# Patient Record
Sex: FEMALE | URBAN - METROPOLITAN AREA
[De-identification: names, ages, dates, MRNs, and addresses within clinical notes are randomized per-mention and may not be internally consistent; named-entity substitution may affect disease eponyms.]

---

## 2017-11-21 ENCOUNTER — APPOINTMENT (RX ONLY)
Dept: URBAN - METROPOLITAN AREA CLINIC 52 | Facility: CLINIC | Age: 42
Setting detail: DERMATOLOGY
End: 2017-11-21

## 2017-11-21 DIAGNOSIS — L83 ACANTHOSIS NIGRICANS: ICD-10-CM

## 2017-11-21 DIAGNOSIS — D22 MELANOCYTIC NEVI: ICD-10-CM

## 2017-11-21 DIAGNOSIS — L72.0 EPIDERMAL CYST: ICD-10-CM

## 2017-11-21 DIAGNOSIS — B07.8 OTHER VIRAL WARTS: ICD-10-CM

## 2017-11-21 PROBLEM — D23.72 OTHER BENIGN NEOPLASM OF SKIN OF LEFT LOWER LIMB, INCLUDING HIP: Status: ACTIVE | Noted: 2017-11-21

## 2017-11-21 PROBLEM — D22.9 MELANOCYTIC NEVI, UNSPECIFIED: Status: ACTIVE | Noted: 2017-11-21

## 2017-11-21 PROCEDURE — ? PRESCRIPTION

## 2017-11-21 PROCEDURE — 99214 OFFICE O/P EST MOD 30 MIN: CPT | Mod: 25

## 2017-11-21 PROCEDURE — 17110 DESTRUCTION B9 LES UP TO 14: CPT

## 2017-11-21 PROCEDURE — ? LIQUID NITROGEN

## 2017-11-21 PROCEDURE — ? COUNSELING

## 2017-11-21 PROCEDURE — ? OTHER

## 2017-11-21 PROCEDURE — ? TREATMENT REGIMEN

## 2017-11-21 RX ORDER — AMMONIUM LACTATE 12 %
LOTION (GRAM) TOPICAL
Qty: 1 | Refills: 4 | Status: ERX | COMMUNITY
Start: 2017-11-21

## 2017-11-21 RX ADMIN — Medication: at 16:29

## 2017-11-21 ASSESSMENT — LOCATION DETAILED DESCRIPTION DERM
LOCATION DETAILED: RIGHT ANTERIOR SHOULDER
LOCATION DETAILED: MID POSTERIOR NECK
LOCATION DETAILED: LEFT ELBOW
LOCATION DETAILED: LEFT MEDIAL INFERIOR EYELID
LOCATION DETAILED: RIGHT ELBOW
LOCATION DETAILED: RIGHT POSTERIOR SHOULDER
LOCATION DETAILED: LEFT SUPERIOR MEDIAL MALAR CHEEK

## 2017-11-21 ASSESSMENT — LOCATION ZONE DERM
LOCATION ZONE: EYELID
LOCATION ZONE: ARM
LOCATION ZONE: FACE
LOCATION ZONE: NECK

## 2017-11-21 ASSESSMENT — LOCATION SIMPLE DESCRIPTION DERM
LOCATION SIMPLE: RIGHT ELBOW
LOCATION SIMPLE: LEFT ELBOW
LOCATION SIMPLE: RIGHT SHOULDER
LOCATION SIMPLE: POSTERIOR NECK
LOCATION SIMPLE: LEFT INFERIOR EYELID
LOCATION SIMPLE: LEFT CHEEK

## 2017-11-21 NOTE — PROCEDURE: TREATMENT REGIMEN
Detail Level: Simple
Plan: Recommend Pt to see ophthalmologist to remove or treat milia underneath the left eye and on left eyelid due to such close proximity to the eye.

## 2017-11-21 NOTE — PROCEDURE: LIQUID NITROGEN
Detail Level: Simple
Add 52 Modifier (Optional): no
Render Post-Care Instructions In Note?: yes
Number Of Freeze-Thaw Cycles: 2 freeze-thaw cycles
Post-Care Instructions: I reviewed with the patient in detail post-care instructions. Patient is to wear sunprotection, and avoid picking at any of the treated lesions. Pt may apply Rubbing alcohol to treated areas two times per day for two days.
Consent: The patient's consent was obtained including but not limited to risks of crusting, scabbing, blistering, scarring, darker or lighter pigmentary change, recurrence, incomplete removal and infection.
Aperture Size (Optional): C
Medical Necessity Clause: This procedure was medically necessary because the lesions that were treated were:  Inflamed and irritating
Medical Necessity Information: It is in your best interest to select a reason for this procedure from the list below. All of these items fulfill various CMS LCD requirements except the new and changing color options.

## 2022-07-09 ENCOUNTER — TELEPHONE ENCOUNTER (OUTPATIENT)
Dept: URBAN - METROPOLITAN AREA CLINIC 121 | Facility: CLINIC | Age: 47
End: 2022-07-09

## 2022-07-10 ENCOUNTER — TELEPHONE ENCOUNTER (OUTPATIENT)
Dept: URBAN - METROPOLITAN AREA CLINIC 121 | Facility: CLINIC | Age: 47
End: 2022-07-10

## 2022-08-08 PROBLEM — Z00.00 ENCOUNTER FOR PREVENTIVE HEALTH EXAMINATION: Status: ACTIVE | Noted: 2022-08-08

## 2022-08-09 ENCOUNTER — APPOINTMENT (OUTPATIENT)
Dept: ORTHOPEDIC SURGERY | Facility: CLINIC | Age: 47
End: 2022-08-09

## 2022-08-09 VITALS — BODY MASS INDEX: 39.99 KG/M2 | WEIGHT: 240 LBS | HEIGHT: 65 IN

## 2022-08-09 DIAGNOSIS — E11.9 TYPE 2 DIABETES MELLITUS W/OUT COMPLICATIONS: ICD-10-CM

## 2022-08-09 DIAGNOSIS — S93.524A SPRAIN OF METATARSOPHALANGEAL JOINT OF RIGHT LESSER TOE(S), INITIAL ENCOUNTER: ICD-10-CM

## 2022-08-09 DIAGNOSIS — M79.7 FIBROMYALGIA: ICD-10-CM

## 2022-08-09 DIAGNOSIS — G35 MULTIPLE SCLEROSIS: ICD-10-CM

## 2022-08-09 PROCEDURE — 99203 OFFICE O/P NEW LOW 30 MIN: CPT

## 2022-08-09 PROCEDURE — 73630 X-RAY EXAM OF FOOT: CPT | Mod: RT

## 2022-08-09 NOTE — DISCUSSION/SUMMARY
[de-identified] : Discussed federico taping and diabetic optimization. Hard soled shoe.\par Elevation and ice. Diabetic footcare handout given. \par f/u 2 weeks

## 2022-08-09 NOTE — PHYSICAL EXAM
[Right] : right foot and ankle [Mild] : mild swelling of toe(s) [5___] : plantar flexion 5[unfilled]/5 [2+] : dorsalis pedis pulse: 2+ [Decreased] : saphenous nerve sensation decreased [4th] : 4th [5th] : 5th [] : no achilles tendon insertion tenderness

## 2022-08-23 ENCOUNTER — APPOINTMENT (OUTPATIENT)
Dept: ORTHOPEDIC SURGERY | Facility: CLINIC | Age: 47
End: 2022-08-23

## 2023-01-01 NOTE — PROCEDURE: OTHER
Detail Level: Detailed
Note Text (......Xxx Chief Complaint.): This diagnosis correlates with the
negative

## 2023-05-16 ENCOUNTER — APPOINTMENT (OUTPATIENT)
Dept: ORTHOPEDIC SURGERY | Facility: CLINIC | Age: 48
End: 2023-05-16

## 2024-01-10 NOTE — HISTORY OF PRESENT ILLNESS
[de-identified] : 8/9/22: Pt is a 47 year old F who presents today for evaluation of their right foot; reports slipped and fell in the office on 8/6/22 due to lack of balance and sensation in the LE due to progressive multiple sclerosis. There is bruising and swelling to the lateral foot. She takes morphine and hydrodocone for left knee pain and lumbar ddd. Hx DM Glucose 143 this AM; last HGA1C 9.0%\par \par Hx fibromyalgia, lupus, bells palsy, MS - prednisone dependent 5mg daily and 10mg prn\par She takes gabapentin  [] : Post Surgical Visit: no [FreeTextEntry1] : R foot Detail Level: Detailed

## 2024-03-25 ENCOUNTER — INPATIENT (INPATIENT)
Facility: HOSPITAL | Age: 49
LOS: 5 days | Discharge: HOME HEALTH | End: 2024-03-31
Attending: INTERNAL MEDICINE | Admitting: INTERNAL MEDICINE
Payer: MEDICARE

## 2024-03-25 VITALS
TEMPERATURE: 98 F | HEART RATE: 87 BPM | OXYGEN SATURATION: 97 % | RESPIRATION RATE: 19 BRPM | DIASTOLIC BLOOD PRESSURE: 84 MMHG | SYSTOLIC BLOOD PRESSURE: 128 MMHG

## 2024-03-25 DIAGNOSIS — L03.011 CELLULITIS OF RIGHT FINGER: ICD-10-CM

## 2024-03-25 DIAGNOSIS — F90.9 ATTENTION-DEFICIT HYPERACTIVITY DISORDER, UNSPECIFIED TYPE: ICD-10-CM

## 2024-03-25 DIAGNOSIS — G35 MULTIPLE SCLEROSIS: ICD-10-CM

## 2024-03-25 DIAGNOSIS — F31.9 BIPOLAR DISORDER, UNSPECIFIED: ICD-10-CM

## 2024-03-25 DIAGNOSIS — E87.6 HYPOKALEMIA: ICD-10-CM
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
## 2024-03-25 DIAGNOSIS — E11.65 TYPE 2 DIABETES MELLITUS WITH HYPERGLYCEMIA: ICD-10-CM

## 2024-03-25 DIAGNOSIS — M79.7 FIBROMYALGIA: ICD-10-CM

## 2024-03-25 DIAGNOSIS — S61.259A OPEN BITE OF UNSPECIFIED FINGER WITHOUT DAMAGE TO NAIL, INITIAL ENCOUNTER: ICD-10-CM

## 2024-03-25 LAB
ALBUMIN SERPL ELPH-MCNC: 3.3 G/DL — SIGNIFICANT CHANGE UP (ref 3.3–5)
ALP SERPL-CCNC: 88 U/L — SIGNIFICANT CHANGE UP (ref 40–120)
ALT FLD-CCNC: 27 U/L — SIGNIFICANT CHANGE UP (ref 12–78)
ANION GAP SERPL CALC-SCNC: 4 MMOL/L — LOW (ref 5–17)
AST SERPL-CCNC: 37 U/L — SIGNIFICANT CHANGE UP (ref 15–37)
BASOPHILS # BLD AUTO: 0.05 K/UL — SIGNIFICANT CHANGE UP (ref 0–0.2)
BASOPHILS NFR BLD AUTO: 0.6 % — SIGNIFICANT CHANGE UP (ref 0–2)
BILIRUB SERPL-MCNC: 0.4 MG/DL — SIGNIFICANT CHANGE UP (ref 0.2–1.2)
BUN SERPL-MCNC: 7 MG/DL — SIGNIFICANT CHANGE UP (ref 7–23)
CALCIUM SERPL-MCNC: 9.4 MG/DL — SIGNIFICANT CHANGE UP (ref 8.5–10.1)
CHLORIDE SERPL-SCNC: 101 MMOL/L — SIGNIFICANT CHANGE UP (ref 96–108)
CO2 SERPL-SCNC: 34 MMOL/L — HIGH (ref 22–31)
CREAT SERPL-MCNC: 0.76 MG/DL — SIGNIFICANT CHANGE UP (ref 0.5–1.3)
EGFR: 97 ML/MIN/1.73M2 — SIGNIFICANT CHANGE UP
EOSINOPHIL # BLD AUTO: 0.16 K/UL — SIGNIFICANT CHANGE UP (ref 0–0.5)
EOSINOPHIL NFR BLD AUTO: 1.8 % — SIGNIFICANT CHANGE UP (ref 0–6)
ERYTHROCYTE [SEDIMENTATION RATE] IN BLOOD: 38 MM/HR — HIGH (ref 0–15)
GLUCOSE BLDC GLUCOMTR-MCNC: 276 MG/DL — HIGH (ref 70–99)
GLUCOSE SERPL-MCNC: 233 MG/DL — HIGH (ref 70–99)
HCG SERPL-ACNC: 3 MIU/ML — SIGNIFICANT CHANGE UP
HCT VFR BLD CALC: 34.9 % — SIGNIFICANT CHANGE UP (ref 34.5–45)
HGB BLD-MCNC: 11.5 G/DL — SIGNIFICANT CHANGE UP (ref 11.5–15.5)
IMM GRANULOCYTES NFR BLD AUTO: 1.2 % — HIGH (ref 0–0.9)
LYMPHOCYTES # BLD AUTO: 2.62 K/UL — SIGNIFICANT CHANGE UP (ref 1–3.3)
LYMPHOCYTES # BLD AUTO: 29.4 % — SIGNIFICANT CHANGE UP (ref 13–44)
MCHC RBC-ENTMCNC: 28.8 PG — SIGNIFICANT CHANGE UP (ref 27–34)
MCHC RBC-ENTMCNC: 33 G/DL — SIGNIFICANT CHANGE UP (ref 32–36)
MCV RBC AUTO: 87.5 FL — SIGNIFICANT CHANGE UP (ref 80–100)
MONOCYTES # BLD AUTO: 0.44 K/UL — SIGNIFICANT CHANGE UP (ref 0–0.9)
MONOCYTES NFR BLD AUTO: 4.9 % — SIGNIFICANT CHANGE UP (ref 2–14)
NEUTROPHILS # BLD AUTO: 5.52 K/UL — SIGNIFICANT CHANGE UP (ref 1.8–7.4)
NEUTROPHILS NFR BLD AUTO: 62.1 % — SIGNIFICANT CHANGE UP (ref 43–77)
NRBC # BLD: 0 /100 WBCS — SIGNIFICANT CHANGE UP (ref 0–0)
PLATELET # BLD AUTO: 232 K/UL — SIGNIFICANT CHANGE UP (ref 150–400)
POTASSIUM SERPL-MCNC: 3.3 MMOL/L — LOW (ref 3.5–5.3)
POTASSIUM SERPL-SCNC: 3.3 MMOL/L — LOW (ref 3.5–5.3)
PROT SERPL-MCNC: 7.2 GM/DL — SIGNIFICANT CHANGE UP (ref 6–8.3)
RBC # BLD: 3.99 M/UL — SIGNIFICANT CHANGE UP (ref 3.8–5.2)
RBC # FLD: 13.7 % — SIGNIFICANT CHANGE UP (ref 10.3–14.5)
SODIUM SERPL-SCNC: 139 MMOL/L — SIGNIFICANT CHANGE UP (ref 135–145)
WBC # BLD: 8.9 K/UL — SIGNIFICANT CHANGE UP (ref 3.8–10.5)
WBC # FLD AUTO: 8.9 K/UL — SIGNIFICANT CHANGE UP (ref 3.8–10.5)

## 2024-03-25 PROCEDURE — 99222 1ST HOSP IP/OBS MODERATE 55: CPT

## 2024-03-25 PROCEDURE — 73130 X-RAY EXAM OF HAND: CPT | Mod: 26,RT

## 2024-03-25 PROCEDURE — 99285 EMERGENCY DEPT VISIT HI MDM: CPT

## 2024-03-25 RX ORDER — DEXTROSE 50 % IN WATER 50 %
25 SYRINGE (ML) INTRAVENOUS ONCE
Refills: 0 | Status: DISCONTINUED | OUTPATIENT
Start: 2024-03-25 | End: 2024-03-28

## 2024-03-25 RX ORDER — MONTELUKAST 4 MG/1
10 TABLET, CHEWABLE ORAL AT BEDTIME
Refills: 0 | Status: DISCONTINUED | OUTPATIENT
Start: 2024-03-25 | End: 2024-03-28

## 2024-03-25 RX ORDER — OXYCODONE AND ACETAMINOPHEN 5; 325 MG/1; MG/1
1 TABLET ORAL EVERY 12 HOURS
Refills: 0 | Status: DISCONTINUED | OUTPATIENT
Start: 2024-03-25 | End: 2024-03-28

## 2024-03-25 RX ORDER — DEXTROSE 50 % IN WATER 50 %
15 SYRINGE (ML) INTRAVENOUS ONCE
Refills: 0 | Status: DISCONTINUED | OUTPATIENT
Start: 2024-03-25 | End: 2024-03-28

## 2024-03-25 RX ORDER — DEXTROAMPHETAMINE SACCHARATE, AMPHETAMINE ASPARTATE, DEXTROAMPHETAMINE SULFATE AND AMPHETAMINE SULFATE 1.875; 1.875; 1.875; 1.875 MG/1; MG/1; MG/1; MG/1
20 TABLET ORAL
Refills: 0 | Status: DISCONTINUED | OUTPATIENT
Start: 2024-03-25 | End: 2024-03-26

## 2024-03-25 RX ORDER — SODIUM CHLORIDE 9 MG/ML
1000 INJECTION INTRAMUSCULAR; INTRAVENOUS; SUBCUTANEOUS ONCE
Refills: 0 | Status: COMPLETED | OUTPATIENT
Start: 2024-03-25 | End: 2024-03-25

## 2024-03-25 RX ORDER — ONDANSETRON 8 MG/1
4 TABLET, FILM COATED ORAL EVERY 8 HOURS
Refills: 0 | Status: DISCONTINUED | OUTPATIENT
Start: 2024-03-25 | End: 2024-03-28

## 2024-03-25 RX ORDER — SODIUM CHLORIDE 9 MG/ML
1000 INJECTION, SOLUTION INTRAVENOUS
Refills: 0 | Status: DISCONTINUED | OUTPATIENT
Start: 2024-03-25 | End: 2024-03-28

## 2024-03-25 RX ORDER — FUROSEMIDE 40 MG
20 TABLET ORAL DAILY
Refills: 0 | Status: DISCONTINUED | OUTPATIENT
Start: 2024-03-25 | End: 2024-03-28

## 2024-03-25 RX ORDER — LORATADINE 10 MG/1
10 TABLET ORAL DAILY
Refills: 0 | Status: DISCONTINUED | OUTPATIENT
Start: 2024-03-25 | End: 2024-03-28

## 2024-03-25 RX ORDER — INSULIN LISPRO 100/ML
VIAL (ML) SUBCUTANEOUS AT BEDTIME
Refills: 0 | Status: DISCONTINUED | OUTPATIENT
Start: 2024-03-25 | End: 2024-03-28

## 2024-03-25 RX ORDER — DEXTROSE 50 % IN WATER 50 %
12.5 SYRINGE (ML) INTRAVENOUS ONCE
Refills: 0 | Status: DISCONTINUED | OUTPATIENT
Start: 2024-03-25 | End: 2024-03-28

## 2024-03-25 RX ORDER — PANTOPRAZOLE SODIUM 20 MG/1
40 TABLET, DELAYED RELEASE ORAL
Refills: 0 | Status: DISCONTINUED | OUTPATIENT
Start: 2024-03-25 | End: 2024-03-28

## 2024-03-25 RX ORDER — TRAZODONE HCL 50 MG
50 TABLET ORAL AT BEDTIME
Refills: 0 | Status: DISCONTINUED | OUTPATIENT
Start: 2024-03-25 | End: 2024-03-28

## 2024-03-25 RX ORDER — METRONIDAZOLE 500 MG
500 TABLET ORAL ONCE
Refills: 0 | Status: COMPLETED | OUTPATIENT
Start: 2024-03-25 | End: 2024-03-25

## 2024-03-25 RX ORDER — ACETAMINOPHEN 500 MG
650 TABLET ORAL EVERY 6 HOURS
Refills: 0 | Status: DISCONTINUED | OUTPATIENT
Start: 2024-03-25 | End: 2024-03-28

## 2024-03-25 RX ORDER — CEFTRIAXONE 500 MG/1
1000 INJECTION, POWDER, FOR SOLUTION INTRAMUSCULAR; INTRAVENOUS ONCE
Refills: 0 | Status: COMPLETED | OUTPATIENT
Start: 2024-03-25 | End: 2024-03-25

## 2024-03-25 RX ORDER — SUMATRIPTAN SUCCINATE 4 MG/.5ML
100 INJECTION, SOLUTION SUBCUTANEOUS DAILY
Refills: 0 | Status: DISCONTINUED | OUTPATIENT
Start: 2024-03-25 | End: 2024-03-28

## 2024-03-25 RX ORDER — OXCARBAZEPINE 300 MG/1
600 TABLET, FILM COATED ORAL
Refills: 0 | Status: DISCONTINUED | OUTPATIENT
Start: 2024-03-25 | End: 2024-03-28

## 2024-03-25 RX ORDER — GLUCAGON INJECTION, SOLUTION 0.5 MG/.1ML
1 INJECTION, SOLUTION SUBCUTANEOUS ONCE
Refills: 0 | Status: DISCONTINUED | OUTPATIENT
Start: 2024-03-25 | End: 2024-03-28

## 2024-03-25 RX ORDER — CYCLOBENZAPRINE HYDROCHLORIDE 10 MG/1
10 TABLET, FILM COATED ORAL DAILY
Refills: 0 | Status: DISCONTINUED | OUTPATIENT
Start: 2024-03-25 | End: 2024-03-28

## 2024-03-25 RX ORDER — MORPHINE SULFATE 50 MG/1
15 CAPSULE, EXTENDED RELEASE ORAL
Refills: 0 | Status: DISCONTINUED | OUTPATIENT
Start: 2024-03-25 | End: 2024-03-28

## 2024-03-25 RX ORDER — GABAPENTIN 400 MG/1
400 CAPSULE ORAL
Refills: 0 | Status: DISCONTINUED | OUTPATIENT
Start: 2024-03-25 | End: 2024-03-28

## 2024-03-25 RX ORDER — METOPROLOL TARTRATE 50 MG
25 TABLET ORAL DAILY
Refills: 0 | Status: DISCONTINUED | OUTPATIENT
Start: 2024-03-25 | End: 2024-03-28

## 2024-03-25 RX ORDER — METRONIDAZOLE 500 MG
500 TABLET ORAL EVERY 8 HOURS
Refills: 0 | Status: DISCONTINUED | OUTPATIENT
Start: 2024-03-25 | End: 2024-03-27

## 2024-03-25 RX ORDER — ARIPIPRAZOLE 15 MG/1
2 TABLET ORAL AT BEDTIME
Refills: 0 | Status: DISCONTINUED | OUTPATIENT
Start: 2024-03-25 | End: 2024-03-28

## 2024-03-25 RX ORDER — LANOLIN ALCOHOL/MO/W.PET/CERES
3 CREAM (GRAM) TOPICAL AT BEDTIME
Refills: 0 | Status: DISCONTINUED | OUTPATIENT
Start: 2024-03-25 | End: 2024-03-28

## 2024-03-25 RX ORDER — INSULIN LISPRO 100/ML
VIAL (ML) SUBCUTANEOUS
Refills: 0 | Status: DISCONTINUED | OUTPATIENT
Start: 2024-03-25 | End: 2024-03-28

## 2024-03-25 RX ORDER — POTASSIUM CHLORIDE 20 MEQ
40 PACKET (EA) ORAL ONCE
Refills: 0 | Status: COMPLETED | OUTPATIENT
Start: 2024-03-25 | End: 2024-03-25

## 2024-03-25 RX ORDER — TETANUS TOXOID, REDUCED DIPHTHERIA TOXOID AND ACELLULAR PERTUSSIS VACCINE, ADSORBED 5; 2.5; 8; 8; 2.5 [IU]/.5ML; [IU]/.5ML; UG/.5ML; UG/.5ML; UG/.5ML
0.5 SUSPENSION INTRAMUSCULAR ONCE
Refills: 0 | Status: DISCONTINUED | OUTPATIENT
Start: 2024-03-25 | End: 2024-03-28

## 2024-03-25 RX ADMIN — MORPHINE SULFATE 15 MILLIGRAM(S): 50 CAPSULE, EXTENDED RELEASE ORAL at 23:25

## 2024-03-25 RX ADMIN — ARIPIPRAZOLE 2 MILLIGRAM(S): 15 TABLET ORAL at 22:54

## 2024-03-25 RX ADMIN — MONTELUKAST 10 MILLIGRAM(S): 4 TABLET, CHEWABLE ORAL at 22:58

## 2024-03-25 RX ADMIN — OXYCODONE AND ACETAMINOPHEN 1 TABLET(S): 5; 325 TABLET ORAL at 22:58

## 2024-03-25 RX ADMIN — SODIUM CHLORIDE 1000 MILLILITER(S): 9 INJECTION INTRAMUSCULAR; INTRAVENOUS; SUBCUTANEOUS at 19:17

## 2024-03-25 RX ADMIN — MORPHINE SULFATE 15 MILLIGRAM(S): 50 CAPSULE, EXTENDED RELEASE ORAL at 22:58

## 2024-03-25 RX ADMIN — CEFTRIAXONE 100 MILLIGRAM(S): 500 INJECTION, POWDER, FOR SOLUTION INTRAMUSCULAR; INTRAVENOUS at 16:19

## 2024-03-25 RX ADMIN — Medication 20 MILLIGRAM(S): at 22:53

## 2024-03-25 RX ADMIN — OXCARBAZEPINE 600 MILLIGRAM(S): 300 TABLET, FILM COATED ORAL at 22:54

## 2024-03-25 RX ADMIN — CEFTRIAXONE 1000 MILLIGRAM(S): 500 INJECTION, POWDER, FOR SOLUTION INTRAMUSCULAR; INTRAVENOUS at 16:54

## 2024-03-25 RX ADMIN — Medication 110 MILLIGRAM(S): at 23:03

## 2024-03-25 RX ADMIN — SODIUM CHLORIDE 1000 MILLILITER(S): 9 INJECTION INTRAMUSCULAR; INTRAVENOUS; SUBCUTANEOUS at 17:17

## 2024-03-25 RX ADMIN — Medication 1: at 22:52

## 2024-03-25 RX ADMIN — OXYCODONE AND ACETAMINOPHEN 1 TABLET(S): 5; 325 TABLET ORAL at 23:25

## 2024-03-25 RX ADMIN — Medication 100 MILLIGRAM(S): at 16:37

## 2024-03-25 RX ADMIN — Medication 40 MILLIGRAM(S): at 22:53

## 2024-03-25 NOTE — H&P ADULT - TIME BILLING
coordination of care with ER PA and pharmacist, obtaining history, performing a physical examination, reviewing and interpreting labs and imaging, ordering further studies and tests, explaining the diagnosis and treatment plan to patient, completing medication reconciliation, and documentation as above.

## 2024-03-25 NOTE — ED ADULT TRIAGE NOTE - CHIEF COMPLAINT QUOTE
Pt sent in by [facility] with c/o R 4th finger infection ( abscess ). pt states, she was scratched by her cat x 1 week ago

## 2024-03-25 NOTE — H&P ADULT - NSICDXPASTMEDICALHX_GEN_ALL_CORE_FT
PAST MEDICAL HISTORY:  ADHD     Bipolar disorder     Diabetes mellitus type II, non insulin dependent     Fibromyalgia     Lupus     Migraines     Multiple sclerosis

## 2024-03-25 NOTE — H&P ADULT - ASSESSMENT
Nelda Ley is a 48 year old female with PMHx of bipolar disorder, multiple sclerosis, lupus, NIDDM2, chronic migraines, ADHD, and fibromyalgia who presented to the ED on 3/25/24 for complaints of cat bite and admitted for R. 4th finger cellulitis and open wounds secondary to cat bite, need to r/o tendon injury.     R. 4th finger cellulitis and open wounds secondary to cat bite, need to r/o tendon injury  Sustained cat bite on 3/17/24, took 7-day course of levofloxacin 750 mg, initially with improvement of erythema and swelling  New serosanguineous drainage and inability to flex or extend R. 4th finger x 3 days  R. hand x-ray with swelling of fourth digit  Afebrile, no leukocytosis, ESR 38  S/p 1L NS bolus, ceftriaxone 1 g IV, and metronidazole 500 mg IV in the ED  Started on doxycycline 100 mg IV BID and metronidazole 500 mg IV q8 for MRSA and anaerobic coverage given anaphylactic PCN allergy  F/u CRP, MRI R. hand to r/o tendon injury    Hypokalemia  Potassium 3.3  S/p KCl 40 mEq in the ED  F/u repeat K and Mag in AM      Chronic medical conditions:  Depression: PTA paroxetine 40 mg  Bipolar disorder: PTA oxcarbazepine 600 mg BID, aripiprazole 2 mg qhs  Multiple sclerosis: PTA prednisone 20 mg  NIDDM2 with hyperglycemia: blood glucose 233 on admission, POC qac and qhs, PTA Soliqua BID held, low dose SSI qac and qhs started, blood glucose goal < 180, f/u A1c  Chronic migraines: PTA sumatriptan 100 mg PRN  ADHD: PTA Adderall 20 mg BID  Fibromyalgia: PTA gabapentin 400 mg BID, morphine ER 15 mg BID, Vicodin 7.5 / 325 mg BID reordered as Percocet 5 / 325 mg BID given Vicodin not on formulary    Medication reconciliation completed using med list provided by patient. Nelda Ley is a 48 year old female with PMHx of bipolar disorder, multiple sclerosis, lupus, NIDDM2, chronic migraines, ADHD, and fibromyalgia who presented to the ED on 3/25/24 for complaints of cat bite and admitted for R. 4th finger cellulitis and open wounds secondary to cat bite, need to r/o tendon injury.     R. 4th finger cellulitis and open wounds secondary to cat bite, need to r/o tendon injury  Sustained cat bite on 3/17/24, took 7-day course of levofloxacin 750 mg, initially with improvement of erythema and swelling  New serosanguineous drainage and inability to flex or extend R. 4th finger x 3 days  R. hand x-ray with swelling of fourth digit  Afebrile, no leukocytosis, ESR 38  S/p 1L NS bolus, ceftriaxone 1 g IV, and metronidazole 500 mg IV in the ED  Tetanus vaccine ordered  Started on doxycycline 100 mg IV BID and metronidazole 500 mg IV q8 for MRSA and anaerobic coverage given anaphylactic PCN allergy  F/u CRP, MRI R. hand to r/o tendon injury    Hypokalemia  Potassium 3.3  S/p KCl 40 mEq in the ED  F/u repeat K and Mag in AM      Chronic medical conditions:  Depression: PTA paroxetine 40 mg  Bipolar disorder: PTA oxcarbazepine 600 mg BID, aripiprazole 2 mg qhs  Multiple sclerosis: PTA prednisone 20 mg  NIDDM2 with hyperglycemia: blood glucose 233 on admission, POC qac and qhs, PTA Soliqua BID held, low dose SSI qac and qhs started, blood glucose goal < 180, f/u A1c  Chronic migraines: PTA sumatriptan 100 mg PRN  ADHD: PTA Adderall 20 mg BID  Fibromyalgia: PTA gabapentin 400 mg BID, morphine ER 15 mg BID, Vicodin 7.5 / 325 mg BID reordered as Percocet 5 / 325 mg BID given Vicodin not on formulary    Medication reconciliation completed using med list provided by patient.

## 2024-03-25 NOTE — ED PROVIDER NOTE - ATTENDING APP SHARED VISIT CONTRIBUTION OF CARE
Vish:  I have independently evaluated the patient and have documented in the appropriate sections above.  I agree with the exam and plan as noted above by the JENNA.

## 2024-03-25 NOTE — PHARMACY COMMUNICATION NOTE - COMMENTS
patient takes vicodin 7.5/325 BID at home in addition to the the morphine ER 15mg BID.  Confirmed and verified by Dr. Butt

## 2024-03-25 NOTE — CHART NOTE - NSCHARTNOTEFT_GEN_A_CORE
Confidential Drug Utilization Report  Search Terms: Nelda Ley, 1975Search Date: 03/25/2024 19:02:37 PM  Searching on behalf of: Myself  The Drug Utilization Report below displays all of the controlled substance prescriptions, if any, that your patient has filled in the last twelve months. The information displayed on this report is compiled from pharmacy submissions to the Department, and accurately reflects the information as submitted by the pharmacies.    This report was requested by: Tiff Butt | Reference #: [id number]    Practitioner Count: 1  Pharmacy Count: 1  Current Opioid Prescriptions: 0  Current Benzodiazepine Prescriptions: 1  Current Stimulant Prescriptions: 0      Patient Demographic Information (PDI)       PDI	First Name	Last Name	Birth Date	Gender	Street Address	[facility]	Zip Code  A	Nelda Ley	1975	Female	[address]	[id number]    Prescription Information      PDI Filter:    PDI	My Rx	Current Rx	Drug Type	Rx Written	Rx Dispensed	Drug	Quantity	Days Supply  A	N	Y	B	02/22/2024	02/28/2024	alprazolam 0.5 mg tablet	120	30  Prescriber Name Bandar Odonnell MD  Prescriber MATTHIAS # [id number]  Payment Method Cash  Dispenser [facility] #[id number]  A	N	N	O	02/01/2024	02/14/2024	morphine sulf er 15 mg tablet	60	30  Prescriber Name Bandar Odonnell MD  Prescriber MATTHIAS # [id number]  Payment Method Medicare Dispenser [facility] #[id number]  A	N	N	O	02/01/2024	02/14/2024	hydrocodone-acetaminophen 7.5-325 mg tablet	90	30  Prescriber Name Bandar Odonnell MD  Prescriber MATTHIAS # [id number]  Payment Method Medicare Dispenser [facility] #[id number]  A	N	N	S	01/20/2024	01/26/2024	dextroamp-amphetamin 20 mg tab	60	30  Prescriber Name Bandar Odonnell MD  Prescriber MATTHIAS # [id number]  Payment Method Medicare Dispenser [facility] #[id number]  A	N	N	O	12/23/2023	01/03/2024	hydrocodone-acetaminophen 7.5-325 mg tablet	90	30  Prescriber Name Bandar Odonnell MD  Prescriber MATTHIAS # [id number]  Payment Method Medicare Dispenser [facility] #[id number]  A	N	N	O	12/23/2023	12/27/2023	morphine sulf er 15 mg tablet	60	30  Prescriber Name Bandar Odonnell MD  Prescriber MATTHIAS # [id number]  Payment Method Medicare Dispenser [facility] #[id number]  A	N	N	O	11/21/2023	11/28/2023	hydrocodone-acetaminophen 7.5-325 mg tablet	90	30  Prescriber Name Bandar Odonnell MD  Prescriber MATTHIAS # [id number]  Payment Method Medicare Dispenser [facility] #[id number]  A	N	N	O	11/21/2023	11/24/2023	morphine sulf er 15 mg tablet	60	30  Prescriber Name Bandar Odonnell MD  Prescriber MATTHIAS # [id number]  Payment Method Medicare Dispenser [facility] #[id number]  A	N	N	S	11/21/2023	11/24/2023	dextroamp-amphetamin 20 mg tab	60	30  Prescriber Name Bandar Odonnell MD  Prescriber MATTHIAS # [id number]  Payment Method Medicare Dispenser [facility] #[id number]  A	N	N	O	10/16/2023	10/20/2023	morphine sulf er 15 mg tablet	60	30  Prescriber Name Bandar Odonnell MD  Prescriber MATTHIAS # [id number]  Payment Method Medicare Dispenser [facility] #[id number]  A	N	N	O	09/12/2023	09/22/2023	hydrocodone-acetaminophen 7.5-325 mg tablet	90	30  Prescriber Name Bandar Odonnell MD  Prescriber MATTHIAS # [id number]  Payment Method Medicare Dispenser [facility] #[id number]  A	N	N	O	09/12/2023	09/15/2023	morphine sulf er 15 mg tablet	60	30  Prescriber Name Bandar Odonnell MD  Prescriber MATTHIAS # [id number]  Payment Method Medicare Dispenser [facility] #[id number]  A	N	N	S	09/12/2023	09/15/2023	dextroamp-amphetamin 20 mg tab	60	30  Prescriber Name Bandar Odonnell MD  Prescriber MATTHIAS # [id number]  Payment Method Medicare Dispenser [facility] #[id number]  A	N	N	O	08/08/2023	08/18/2023	hydrocodone-acetaminophen 7.5-325 mg tablet	90	30  Prescriber Name Bandar Odonnell MD  Prescriber MATTHIAS # [id number]  Payment Method Medicare Dispenser [facility] #[id number]  A	N	N	O	07/06/2023	07/18/2023	hydrocodone-acetaminophen 7.5-325 mg tablet	90	30  Prescriber Name Bandar Odonnell MD  Prescriber MATTHIAS # [id number]  Payment Method Medicare Dispenser [facility] #[id number]  A	N	N	O	07/06/2023	07/13/2023	guaifen-codeine 100-10 mg/5 ml	250ml	6  Prescriber Name Bandar Odonnell MD  Prescriber MATTHIAS # [id number]  Payment Method [facility] #[id number]  A	N	N	O	07/06/2023	07/07/2023	morphine sulf er 15 mg tablet	60	30  Prescriber Name Bandar Odonnell MD  Prescriber MATTHIAS # [id number]  Payment Method Medicare Dispenser [facility] #[id number]  A	N	N	S	07/06/2023	07/07/2023	dextroamp-amphetamin 20 mg tab	60	30  Prescriber Name Bandar Odonnell MD  Prescriber MATTHIAS # [id number]  Payment Method Medicare Dispenser [facility] #[id number]  A	N	N	O	06/20/2023	06/21/2023	guaifen-codeine 100-10 mg/5 ml	250ml	13  Prescriber Name Banadr Odonnell MD  Prescriber MATTHIAS # [id number]  Payment Method [facility] #[id number]  A	N	N	O	06/01/2023	06/02/2023	morphine sulf er 15 mg tablet	60	30  Prescriber Name Bandar Odonnell MD  Prescriber MATTHIAS # [id number]  Payment Method Medicare Dispenser [facility] #[id number]  A	N	N	O	04/11/2023	04/21/2023	morphine sulf er 15 mg tablet	60	30  Prescriber Name Bandar Odonnell MD  Prescriber MATTHIAS # [id number]  Payment Method Medicare Dispenser [facility] #[id number]  A	N	N	S	03/21/2023	03/28/2023	dextroamp-amphetamin 10 mg tab	60	30  Prescriber Name Bandar Odonnell MD  Prescriber MATTHIAS # [id number]  Payment Method Medicare Dispenser [facility].    * - Details of Drug Type : O = Opioid, B = Benzodiazepine, S = Stimulant    * - Drugs marked with an asterisk are compound drugs. If the compound drug is made up of more than one controlled substance, then each controlled substance will be a separate row in the table.

## 2024-03-25 NOTE — ED PROVIDER NOTE - PLAN OF CARE
Patient currently afebrile, hemodynamically stable, spO2 100%. Physical exam as noted. Based on history and physical, differentials include but are not limited to cellulitis 2/2 cat scratch ds vs osteomyelitis. low suspicion for necrotizing fascitis. Plan to assess patient for acute pathology as listed above with XR, labs. Start IV abx given failed PO trial as outpatient. Likely admit.

## 2024-03-25 NOTE — ED PROVIDER NOTE - CARE PLAN
Principal Discharge DX:	Cellulitis  Assessment and plan of treatment:	Patient currently afebrile, hemodynamically stable, spO2 100%. Physical exam as noted. Based on history and physical, differentials include but are not limited to cellulitis 2/2 cat scratch ds vs osteomyelitis. low suspicion for necrotizing fascitis. Plan to assess patient for acute pathology as listed above with XR, labs. Start IV abx given failed PO trial as outpatient. Likely admit.   1

## 2024-03-25 NOTE — ED ADULT NURSE NOTE - NSFALLUNIVINTERV_ED_ALL_ED
Bed/Stretcher in lowest position, wheels locked, appropriate side rails in place/Call bell, personal items and telephone in reach/Instruct patient to call for assistance before getting out of bed/chair/stretcher/Non-slip footwear applied when patient is off stretcher/[place] to call system/Physically safe environment - no spills, clutter or unnecessary equipment/Purposeful proactive rounding/Room/bathroom lighting operational, light cord in reach

## 2024-03-25 NOTE — ED PROVIDER NOTE - PHYSICAL EXAMINATION
General: Well appearing in no acute distress, alert and cooperative  Neck: Soft and supple  Cardiac: Regular rate and regular rhythm, no murmurs  Resp: Unlabored respiratory effort, lungs CTAB, speaking in full sentences, no wheezes  Abd: Soft, non-tender, non-distended, no guarding or rebound tenderness  MSK: See Skin exam.  Skin: +swelling around PIP joint of 4th digit on right hand around puncture wound on medial aspect with surrounding erythema and warmth. Limited range of motion of fourth finger 2/2 pain and swelling. Peripheral pulses 2+ and symmetric in b/l upper extremities. No underlying crepitus. Compartment soft.   Neuro: AO x 3, moves all extremities symmetrically, Motor strength 5/5 bilaterally UE and LE, sensation grossly intact General: Well appearing in no acute distress, alert and cooperative  Neck: Soft and supple  Cardiac: Regular rate and regular rhythm, no murmurs  Resp: Unlabored respiratory effort, lungs CTAB, speaking in full sentences, no wheezes  Abd: Soft, non-tender, non-distended, no guarding or rebound tenderness  MSK: See Skin exam.  decreased ROM at 4th digit of hand  Skin: +swelling around PIP joint of 4th digit on right hand around puncture wound on medial aspect with surrounding erythema and warmth. Limited range of motion of fourth finger 2/2 pain and swelling. Peripheral pulses 2+ and symmetric in b/l upper extremities. No underlying crepitus. Compartment soft.   Neuro: AO x 3, moves all extremities symmetrically, Motor strength 5/5 bilaterally UE and LE, sensation grossly intact

## 2024-03-25 NOTE — ED ADULT NURSE NOTE - OBJECTIVE STATEMENT
Pt presents to ED A&O x3 from [facility] c/o R 4 finger infection and swelling x 1 week ago. Pt reports being scratched by personal in house cat on March 17, 2024. Pt  reports cat is up to date with vaccines. Pt denies N/V, fever, chills or dizziness. Wound appears red and finger swollen, no drainage noted.

## 2024-03-25 NOTE — PATIENT PROFILE ADULT - FALL HARM RISK - HARM RISK INTERVENTIONS

## 2024-03-25 NOTE — H&P ADULT - HISTORY OF PRESENT ILLNESS
Nelda Ley is a 48 year old female with PMHx of bipolar disorder, multiple sclerosis, lupus, NIDDM2, chronic migraines, ADHD, and fibromyalgia who presented to the ED on 3/25/24 for complaints of cat bite.     Patient reports her cat bit her right ring finger on 4/17/24. She called her PCP who sent in a prescription for levofloxacin 750 mg. Took a total of 7 day course of antibiotics. States it looks like her finger was looking better but then suddenly got worse three days ago. Described as serosanguineous drainage. Then developed inability to flex right ring finger. Went to urgent care today where pus was expressed from wound. Sent to the ER for further evaluation.    In the ED, VSS. CBC unremarkable. Potassium 3.3, blood glucose 233, ESR 38. Right hand x-ray with swelling of fourth digit. Received 1L NS bolus, ceftriaxone 1 g IV, metronidazole 500 mg IV, and KCl 40 mEq.

## 2024-03-25 NOTE — H&P ADULT - NSHPPHYSICALEXAM_GEN_ALL_CORE
T(C): 36.7 (03-25-24 @ 22:09), Max: 36.9 (03-25-24 @ 12:47)  HR: 77 (03-25-24 @ 22:09) (77 - 87)  BP: 150/86 (03-25-24 @ 22:09) (123/81 - 150/86)  RR: 18 (03-25-24 @ 22:09) (18 - 19)  SpO2: 97% (03-25-24 @ 22:09) (97% - 98%)    CONSTITUTIONAL: Well groomed, no apparent distress, obese  EYES: PERRLA and symmetric, EOMI  ENMT: Oral mucosa with moist membranes  RESP: No respiratory distress, no use of accessory muscles; CTA b/l  CV: RRR  GI: Soft, NT, ND  MSK: R. fourth finger with swelling, open wounds with mild serosanguineous fluid, inability to flex or extend digit

## 2024-03-25 NOTE — H&P ADULT - NSHPLABSRESULTS_GEN_ALL_CORE
11.5   8.90  )-----------( 232      ( 25 Mar 2024 16:03 )             34.9   03-25    139  |  101  |  7   ----------------------------<  233<H>  3.3<L>   |  34<H>  |  0.76    Ca    9.4      25 Mar 2024 16:03    TPro  7.2  /  Alb  3.3  /  TBili  0.4  /  DBili  x   /  AST  37  /  ALT  27  /  AlkPhos  88  03-25    R. hand x-ray 3/25/24  IMPRESSION:  Swelling of the fourth finger. No acute bony finding.

## 2024-03-26 LAB
A1C WITH ESTIMATED AVERAGE GLUCOSE RESULT: 11.5 % — HIGH (ref 4–5.6)
ANION GAP SERPL CALC-SCNC: 8 MMOL/L — SIGNIFICANT CHANGE UP (ref 5–17)
BUN SERPL-MCNC: 12 MG/DL — SIGNIFICANT CHANGE UP (ref 7–23)
CALCIUM SERPL-MCNC: 9.1 MG/DL — SIGNIFICANT CHANGE UP (ref 8.5–10.1)
CHLORIDE SERPL-SCNC: 101 MMOL/L — SIGNIFICANT CHANGE UP (ref 96–108)
CO2 SERPL-SCNC: 31 MMOL/L — SIGNIFICANT CHANGE UP (ref 22–31)
CREAT SERPL-MCNC: 0.69 MG/DL — SIGNIFICANT CHANGE UP (ref 0.5–1.3)
CRP SERPL-MCNC: 18 MG/L — HIGH
EGFR: 108 ML/MIN/1.73M2 — SIGNIFICANT CHANGE UP
ERYTHROCYTE [SEDIMENTATION RATE] IN BLOOD: 36 MM/HR — HIGH (ref 0–15)
ESTIMATED AVERAGE GLUCOSE: 283 MG/DL — HIGH (ref 68–114)
GLUCOSE BLDC GLUCOMTR-MCNC: 244 MG/DL — HIGH (ref 70–99)
GLUCOSE BLDC GLUCOMTR-MCNC: 271 MG/DL — HIGH (ref 70–99)
GLUCOSE BLDC GLUCOMTR-MCNC: 279 MG/DL — HIGH (ref 70–99)
GLUCOSE BLDC GLUCOMTR-MCNC: 282 MG/DL — HIGH (ref 70–99)
GLUCOSE SERPL-MCNC: 296 MG/DL — HIGH (ref 70–99)
HCT VFR BLD CALC: 34.8 % — SIGNIFICANT CHANGE UP (ref 34.5–45)
HGB BLD-MCNC: 11.3 G/DL — LOW (ref 11.5–15.5)
MAGNESIUM SERPL-MCNC: 1.5 MG/DL — LOW (ref 1.6–2.6)
MCHC RBC-ENTMCNC: 28.5 PG — SIGNIFICANT CHANGE UP (ref 27–34)
MCHC RBC-ENTMCNC: 32.5 G/DL — SIGNIFICANT CHANGE UP (ref 32–36)
MCV RBC AUTO: 87.7 FL — SIGNIFICANT CHANGE UP (ref 80–100)
NRBC # BLD: 0 /100 WBCS — SIGNIFICANT CHANGE UP (ref 0–0)
PLATELET # BLD AUTO: 242 K/UL — SIGNIFICANT CHANGE UP (ref 150–400)
POTASSIUM SERPL-MCNC: 3 MMOL/L — LOW (ref 3.5–5.3)
POTASSIUM SERPL-MCNC: 3.3 MMOL/L — LOW (ref 3.5–5.3)
POTASSIUM SERPL-SCNC: 3 MMOL/L — LOW (ref 3.5–5.3)
POTASSIUM SERPL-SCNC: 3.3 MMOL/L — LOW (ref 3.5–5.3)
RBC # BLD: 3.97 M/UL — SIGNIFICANT CHANGE UP (ref 3.8–5.2)
RBC # FLD: 13.7 % — SIGNIFICANT CHANGE UP (ref 10.3–14.5)
SODIUM SERPL-SCNC: 140 MMOL/L — SIGNIFICANT CHANGE UP (ref 135–145)
WBC # BLD: 7.49 K/UL — SIGNIFICANT CHANGE UP (ref 3.8–10.5)
WBC # FLD AUTO: 7.49 K/UL — SIGNIFICANT CHANGE UP (ref 3.8–10.5)

## 2024-03-26 PROCEDURE — 99232 SBSQ HOSP IP/OBS MODERATE 35: CPT

## 2024-03-26 RX ORDER — POTASSIUM CHLORIDE 20 MEQ
40 PACKET (EA) ORAL EVERY 4 HOURS
Refills: 0 | Status: COMPLETED | OUTPATIENT
Start: 2024-03-26 | End: 2024-03-26

## 2024-03-26 RX ORDER — VANCOMYCIN HCL 1 G
1000 VIAL (EA) INTRAVENOUS EVERY 8 HOURS
Refills: 0 | Status: DISCONTINUED | OUTPATIENT
Start: 2024-03-26 | End: 2024-03-28

## 2024-03-26 RX ORDER — BACITRACIN ZINC 500 UNIT/G
1 OINTMENT IN PACKET (EA) TOPICAL EVERY 12 HOURS
Refills: 0 | Status: DISCONTINUED | OUTPATIENT
Start: 2024-03-26 | End: 2024-03-28

## 2024-03-26 RX ORDER — DEXTROAMPHETAMINE SACCHARATE, AMPHETAMINE ASPARTATE, DEXTROAMPHETAMINE SULFATE AND AMPHETAMINE SULFATE 1.875; 1.875; 1.875; 1.875 MG/1; MG/1; MG/1; MG/1
20 TABLET ORAL
Refills: 0 | Status: DISCONTINUED | OUTPATIENT
Start: 2024-03-26 | End: 2024-03-28

## 2024-03-26 RX ADMIN — Medication 250 MILLIGRAM(S): at 21:53

## 2024-03-26 RX ADMIN — Medication 30 MILLILITER(S): at 21:23

## 2024-03-26 RX ADMIN — Medication 1 APPLICATION(S): at 02:47

## 2024-03-26 RX ADMIN — Medication 25 MILLIGRAM(S): at 06:03

## 2024-03-26 RX ADMIN — OXCARBAZEPINE 600 MILLIGRAM(S): 300 TABLET, FILM COATED ORAL at 06:04

## 2024-03-26 RX ADMIN — Medication 40 MILLIGRAM(S): at 22:06

## 2024-03-26 RX ADMIN — Medication 40 MILLIEQUIVALENT(S): at 19:04

## 2024-03-26 RX ADMIN — GABAPENTIN 400 MILLIGRAM(S): 400 CAPSULE ORAL at 06:04

## 2024-03-26 RX ADMIN — Medication 30 MILLILITER(S): at 01:29

## 2024-03-26 RX ADMIN — Medication 3: at 16:40

## 2024-03-26 RX ADMIN — OXYCODONE AND ACETAMINOPHEN 1 TABLET(S): 5; 325 TABLET ORAL at 12:09

## 2024-03-26 RX ADMIN — Medication 1 APPLICATION(S): at 18:04

## 2024-03-26 RX ADMIN — GABAPENTIN 400 MILLIGRAM(S): 400 CAPSULE ORAL at 19:05

## 2024-03-26 RX ADMIN — Medication 20 MILLIGRAM(S): at 06:06

## 2024-03-26 RX ADMIN — MORPHINE SULFATE 15 MILLIGRAM(S): 50 CAPSULE, EXTENDED RELEASE ORAL at 06:34

## 2024-03-26 RX ADMIN — Medication 20 MILLIGRAM(S): at 22:06

## 2024-03-26 RX ADMIN — MORPHINE SULFATE 15 MILLIGRAM(S): 50 CAPSULE, EXTENDED RELEASE ORAL at 19:06

## 2024-03-26 RX ADMIN — Medication 100 MILLIGRAM(S): at 00:14

## 2024-03-26 RX ADMIN — Medication 3: at 12:08

## 2024-03-26 RX ADMIN — OXYCODONE AND ACETAMINOPHEN 1 TABLET(S): 5; 325 TABLET ORAL at 22:50

## 2024-03-26 RX ADMIN — DEXTROAMPHETAMINE SACCHARATE, AMPHETAMINE ASPARTATE, DEXTROAMPHETAMINE SULFATE AND AMPHETAMINE SULFATE 20 MILLIGRAM(S): 1.875; 1.875; 1.875; 1.875 TABLET ORAL at 19:07

## 2024-03-26 RX ADMIN — PANTOPRAZOLE SODIUM 40 MILLIGRAM(S): 20 TABLET, DELAYED RELEASE ORAL at 19:16

## 2024-03-26 RX ADMIN — MORPHINE SULFATE 15 MILLIGRAM(S): 50 CAPSULE, EXTENDED RELEASE ORAL at 06:04

## 2024-03-26 RX ADMIN — Medication 100 MILLIGRAM(S): at 16:20

## 2024-03-26 RX ADMIN — PANTOPRAZOLE SODIUM 40 MILLIGRAM(S): 20 TABLET, DELAYED RELEASE ORAL at 06:04

## 2024-03-26 RX ADMIN — LORATADINE 10 MILLIGRAM(S): 10 TABLET ORAL at 12:43

## 2024-03-26 RX ADMIN — ARIPIPRAZOLE 2 MILLIGRAM(S): 15 TABLET ORAL at 22:06

## 2024-03-26 RX ADMIN — OXCARBAZEPINE 600 MILLIGRAM(S): 300 TABLET, FILM COATED ORAL at 19:04

## 2024-03-26 RX ADMIN — OXYCODONE AND ACETAMINOPHEN 1 TABLET(S): 5; 325 TABLET ORAL at 13:09

## 2024-03-26 RX ADMIN — Medication 100 MILLIGRAM(S): at 08:47

## 2024-03-26 RX ADMIN — MONTELUKAST 10 MILLIGRAM(S): 4 TABLET, CHEWABLE ORAL at 22:06

## 2024-03-26 RX ADMIN — OXYCODONE AND ACETAMINOPHEN 1 TABLET(S): 5; 325 TABLET ORAL at 22:06

## 2024-03-26 RX ADMIN — Medication 3: at 08:40

## 2024-03-26 RX ADMIN — Medication 110 MILLIGRAM(S): at 12:37

## 2024-03-26 NOTE — PROGRESS NOTE ADULT - ASSESSMENT
Nelda Ley is a 48 year old female with PMHx of bipolar disorder, multiple sclerosis, lupus, NIDDM2, chronic migraines, ADHD, and fibromyalgia who presented to the ED on 3/25/24 for complaints of cat bite and admitted for R. 4th finger cellulitis and open wounds secondary to cat bite, need to r/o tendon injury.     R. 4th finger cellulitis and open wounds secondary to cat bite, need to r/o tendon injury  Sustained cat bite on 3/17/24, took 7-day course of levofloxacin 750 mg, initially with improvement of erythema and swelling  New serosanguineous drainage and inability to flex or extend R. 4th finger x 3 days  R. hand x-ray with swelling of fourth digit  Afebrile, no leukocytosis, ESR 38  S/p 1L NS bolus, ceftriaxone 1 g IV, and metronidazole 500 mg IV in the ED  Tetanus vaccine ordered  Started on doxycycline 100 mg IV BID and metronidazole 500 mg IV q8 for MRSA and anaerobic coverage given anaphylactic PCN allergy  F/u CRP, MRI R. hand to r/o tendon injury    Hypokalemia  Potassium 3.3  S/p KCl 40 mEq in the ED  F/u repeat K and Mag in AM      Chronic medical conditions:  Depression: PTA paroxetine 40 mg  Bipolar disorder: PTA oxcarbazepine 600 mg BID, aripiprazole 2 mg qhs  Multiple sclerosis: PTA prednisone 20 mg  NIDDM2 with hyperglycemia: blood glucose 233 on admission, POC qac and qhs, PTA Soliqua BID held, low dose SSI qac and qhs started, blood glucose goal < 180, f/u A1c  Chronic migraines: PTA sumatriptan 100 mg PRN  ADHD: PTA Adderall 20 mg BID  Fibromyalgia: PTA gabapentin 400 mg BID, morphine ER 15 mg BID, Vicodin 7.5 / 325 mg BID reordered as Percocet 5 / 325 mg BID given Vicodin not on formulary    Medication reconciliation completed using med list provided by patient.

## 2024-03-27 ENCOUNTER — TRANSCRIPTION ENCOUNTER (OUTPATIENT)
Age: 49
End: 2024-03-27

## 2024-03-27 LAB
ANION GAP SERPL CALC-SCNC: 7 MMOL/L — SIGNIFICANT CHANGE UP (ref 5–17)
BASOPHILS # BLD AUTO: 0.04 K/UL — SIGNIFICANT CHANGE UP (ref 0–0.2)
BASOPHILS NFR BLD AUTO: 0.5 % — SIGNIFICANT CHANGE UP (ref 0–2)
BUN SERPL-MCNC: 13 MG/DL — SIGNIFICANT CHANGE UP (ref 7–23)
CALCIUM SERPL-MCNC: 9.3 MG/DL — SIGNIFICANT CHANGE UP (ref 8.5–10.1)
CHLORIDE SERPL-SCNC: 101 MMOL/L — SIGNIFICANT CHANGE UP (ref 96–108)
CO2 SERPL-SCNC: 29 MMOL/L — SIGNIFICANT CHANGE UP (ref 22–31)
CREAT SERPL-MCNC: 0.69 MG/DL — SIGNIFICANT CHANGE UP (ref 0.5–1.3)
CRP SERPL-MCNC: 12 MG/L — HIGH
EGFR: 108 ML/MIN/1.73M2 — SIGNIFICANT CHANGE UP
EOSINOPHIL # BLD AUTO: 0.06 K/UL — SIGNIFICANT CHANGE UP (ref 0–0.5)
EOSINOPHIL NFR BLD AUTO: 0.7 % — SIGNIFICANT CHANGE UP (ref 0–6)
GLUCOSE BLDC GLUCOMTR-MCNC: 181 MG/DL — HIGH (ref 70–99)
GLUCOSE BLDC GLUCOMTR-MCNC: 199 MG/DL — HIGH (ref 70–99)
GLUCOSE BLDC GLUCOMTR-MCNC: 201 MG/DL — HIGH (ref 70–99)
GLUCOSE BLDC GLUCOMTR-MCNC: 320 MG/DL — HIGH (ref 70–99)
GLUCOSE SERPL-MCNC: 305 MG/DL — HIGH (ref 70–99)
HCT VFR BLD CALC: 35.9 % — SIGNIFICANT CHANGE UP (ref 34.5–45)
HGB BLD-MCNC: 11.7 G/DL — SIGNIFICANT CHANGE UP (ref 11.5–15.5)
IMM GRANULOCYTES NFR BLD AUTO: 1.2 % — HIGH (ref 0–0.9)
LYMPHOCYTES # BLD AUTO: 1.65 K/UL — SIGNIFICANT CHANGE UP (ref 1–3.3)
LYMPHOCYTES # BLD AUTO: 19.4 % — SIGNIFICANT CHANGE UP (ref 13–44)
MCHC RBC-ENTMCNC: 28.3 PG — SIGNIFICANT CHANGE UP (ref 27–34)
MCHC RBC-ENTMCNC: 32.6 G/DL — SIGNIFICANT CHANGE UP (ref 32–36)
MCV RBC AUTO: 86.9 FL — SIGNIFICANT CHANGE UP (ref 80–100)
MONOCYTES # BLD AUTO: 0.34 K/UL — SIGNIFICANT CHANGE UP (ref 0–0.9)
MONOCYTES NFR BLD AUTO: 4 % — SIGNIFICANT CHANGE UP (ref 2–14)
NEUTROPHILS # BLD AUTO: 6.32 K/UL — SIGNIFICANT CHANGE UP (ref 1.8–7.4)
NEUTROPHILS NFR BLD AUTO: 74.2 % — SIGNIFICANT CHANGE UP (ref 43–77)
NRBC # BLD: 0 /100 WBCS — SIGNIFICANT CHANGE UP (ref 0–0)
PLATELET # BLD AUTO: 239 K/UL — SIGNIFICANT CHANGE UP (ref 150–400)
POTASSIUM SERPL-MCNC: 3.3 MMOL/L — LOW (ref 3.5–5.3)
POTASSIUM SERPL-SCNC: 3.3 MMOL/L — LOW (ref 3.5–5.3)
RBC # BLD: 4.13 M/UL — SIGNIFICANT CHANGE UP (ref 3.8–5.2)
RBC # FLD: 13.7 % — SIGNIFICANT CHANGE UP (ref 10.3–14.5)
SODIUM SERPL-SCNC: 137 MMOL/L — SIGNIFICANT CHANGE UP (ref 135–145)
VANCOMYCIN TROUGH SERPL-MCNC: 13.2 UG/ML — SIGNIFICANT CHANGE UP (ref 10–20)
WBC # BLD: 8.51 K/UL — SIGNIFICANT CHANGE UP (ref 3.8–10.5)
WBC # FLD AUTO: 8.51 K/UL — SIGNIFICANT CHANGE UP (ref 3.8–10.5)

## 2024-03-27 PROCEDURE — 73200 CT UPPER EXTREMITY W/O DYE: CPT | Mod: 26,RT

## 2024-03-27 PROCEDURE — 99233 SBSQ HOSP IP/OBS HIGH 50: CPT

## 2024-03-27 RX ORDER — INSULIN GLARGINE 100 [IU]/ML
10 INJECTION, SOLUTION SUBCUTANEOUS EVERY MORNING
Refills: 0 | Status: DISCONTINUED | OUTPATIENT
Start: 2024-03-27 | End: 2024-03-28

## 2024-03-27 RX ORDER — METRONIDAZOLE 500 MG
500 TABLET ORAL EVERY 8 HOURS
Refills: 0 | Status: DISCONTINUED | OUTPATIENT
Start: 2024-03-27 | End: 2024-03-28

## 2024-03-27 RX ORDER — INSULIN LISPRO 100/ML
3 VIAL (ML) SUBCUTANEOUS
Refills: 0 | Status: DISCONTINUED | OUTPATIENT
Start: 2024-03-27 | End: 2024-03-28

## 2024-03-27 RX ADMIN — Medication 250 MILLIGRAM(S): at 22:39

## 2024-03-27 RX ADMIN — PANTOPRAZOLE SODIUM 40 MILLIGRAM(S): 20 TABLET, DELAYED RELEASE ORAL at 18:20

## 2024-03-27 RX ADMIN — Medication 3 UNIT(S): at 11:36

## 2024-03-27 RX ADMIN — LORATADINE 10 MILLIGRAM(S): 10 TABLET ORAL at 11:39

## 2024-03-27 RX ADMIN — MORPHINE SULFATE 15 MILLIGRAM(S): 50 CAPSULE, EXTENDED RELEASE ORAL at 18:20

## 2024-03-27 RX ADMIN — Medication 100 MILLIGRAM(S): at 00:01

## 2024-03-27 RX ADMIN — Medication 500 MILLIGRAM(S): at 16:55

## 2024-03-27 RX ADMIN — Medication 100 MILLIGRAM(S): at 08:00

## 2024-03-27 RX ADMIN — Medication 1 APPLICATION(S): at 06:17

## 2024-03-27 RX ADMIN — OXCARBAZEPINE 600 MILLIGRAM(S): 300 TABLET, FILM COATED ORAL at 18:09

## 2024-03-27 RX ADMIN — DEXTROAMPHETAMINE SACCHARATE, AMPHETAMINE ASPARTATE, DEXTROAMPHETAMINE SULFATE AND AMPHETAMINE SULFATE 20 MILLIGRAM(S): 1.875; 1.875; 1.875; 1.875 TABLET ORAL at 06:18

## 2024-03-27 RX ADMIN — OXYCODONE AND ACETAMINOPHEN 1 TABLET(S): 5; 325 TABLET ORAL at 10:33

## 2024-03-27 RX ADMIN — GABAPENTIN 400 MILLIGRAM(S): 400 CAPSULE ORAL at 18:19

## 2024-03-27 RX ADMIN — MONTELUKAST 10 MILLIGRAM(S): 4 TABLET, CHEWABLE ORAL at 22:53

## 2024-03-27 RX ADMIN — Medication 3 UNIT(S): at 16:53

## 2024-03-27 RX ADMIN — ONDANSETRON 4 MILLIGRAM(S): 8 TABLET, FILM COATED ORAL at 19:22

## 2024-03-27 RX ADMIN — GABAPENTIN 400 MILLIGRAM(S): 400 CAPSULE ORAL at 06:18

## 2024-03-27 RX ADMIN — ARIPIPRAZOLE 2 MILLIGRAM(S): 15 TABLET ORAL at 22:45

## 2024-03-27 RX ADMIN — MORPHINE SULFATE 15 MILLIGRAM(S): 50 CAPSULE, EXTENDED RELEASE ORAL at 06:17

## 2024-03-27 RX ADMIN — OXYCODONE AND ACETAMINOPHEN 1 TABLET(S): 5; 325 TABLET ORAL at 11:03

## 2024-03-27 RX ADMIN — Medication 250 MILLIGRAM(S): at 06:20

## 2024-03-27 RX ADMIN — OXCARBAZEPINE 600 MILLIGRAM(S): 300 TABLET, FILM COATED ORAL at 06:18

## 2024-03-27 RX ADMIN — Medication 20 MILLIGRAM(S): at 22:44

## 2024-03-27 RX ADMIN — Medication 4: at 08:24

## 2024-03-27 RX ADMIN — Medication 25 MILLIGRAM(S): at 06:18

## 2024-03-27 RX ADMIN — OXYCODONE AND ACETAMINOPHEN 1 TABLET(S): 5; 325 TABLET ORAL at 22:50

## 2024-03-27 RX ADMIN — Medication 1: at 11:37

## 2024-03-27 RX ADMIN — OXYCODONE AND ACETAMINOPHEN 1 TABLET(S): 5; 325 TABLET ORAL at 23:25

## 2024-03-27 RX ADMIN — Medication 20 MILLIGRAM(S): at 06:18

## 2024-03-27 RX ADMIN — Medication 250 MILLIGRAM(S): at 14:58

## 2024-03-27 RX ADMIN — Medication 2: at 16:54

## 2024-03-27 RX ADMIN — MORPHINE SULFATE 15 MILLIGRAM(S): 50 CAPSULE, EXTENDED RELEASE ORAL at 18:19

## 2024-03-27 RX ADMIN — INSULIN GLARGINE 10 UNIT(S): 100 INJECTION, SOLUTION SUBCUTANEOUS at 10:59

## 2024-03-27 RX ADMIN — PANTOPRAZOLE SODIUM 40 MILLIGRAM(S): 20 TABLET, DELAYED RELEASE ORAL at 06:19

## 2024-03-27 RX ADMIN — DEXTROAMPHETAMINE SACCHARATE, AMPHETAMINE ASPARTATE, DEXTROAMPHETAMINE SULFATE AND AMPHETAMINE SULFATE 20 MILLIGRAM(S): 1.875; 1.875; 1.875; 1.875 TABLET ORAL at 18:13

## 2024-03-27 RX ADMIN — Medication 1 APPLICATION(S): at 18:09

## 2024-03-27 RX ADMIN — Medication 40 MILLIGRAM(S): at 22:45

## 2024-03-27 RX ADMIN — Medication 50 MILLIGRAM(S): at 23:00

## 2024-03-27 NOTE — DIETITIAN INITIAL EVALUATION ADULT - OTHER INFO
Pt with PMH of bipolar disorder, multiple sclerosis, lupus, fibromyalgia, chronic migraines, ADHD, & frequent falls. Pt present for complaints of cat bite; admitted with cellulitis.  Pt lives with her family; uses assistive equipment (rollator). Pt with DM; A1c=11.5%, which indicates poor blood glucose control PTA. Pt reports BG levels have been elevated, especially lately. Pt monitors CHO intake, however has not been eating properly in past month, and possibly not taking insulin properly, as pt has been dealing with mother's recent passing. Pt also on chronic prednisone, contributing to elevated glucose levels. Pt plans on scheduling appointment with an Endocrinologist s/p discharge from hospital.  Pt reports she had gastric sleeve in 2018 and had lost >100 lbs., however gained a lot wt back during pandemic.  Pt has been consuming mostly % of documented meals; amenable to Glucerna supplement to optimize intake. Currently NPO for CT with IV contrast today (03/27).  Denies any chew/swallowing difficulty. Reports some nausea; no vomiting, diarrhea or constipation. Pt with PMH of bipolar disorder, multiple sclerosis, lupus, fibromyalgia, chronic migraines, ADHD, & frequent falls. Pt present for complaints of cat bite; admitted with cellulitis.  Pt lives with her family; uses assistive equipment (rollator). Pt with DM; A1c=11.5%, which indicates poor blood glucose control PTA. Pt reports BG levels have been elevated, especially lately. Pt monitors CHO intake, however has not been eating properly in past month, and possibly not taking insulin properly, as pt has been dealing with mother's recent passing. Pt also on chronic prednisone, contributing to elevated glucose levels. Pt plans on scheduling appointment with an Endocrinologist s/p discharge from hospital.  Pt reports she had gastric sleeve in 2018 and had lost >100 lbs., however gained a lot wt back during pandemic.  Pt has been consuming mostly % of documented meals; amenable to Glucerna supplement to optimize intake. Currently NPO for CT with IV contrast today (03/27).  Denies any chew/swallowing difficulty. Reports some nausea, as well as diarrhea possibly from abx.

## 2024-03-27 NOTE — DIETITIAN INITIAL EVALUATION ADULT - PERTINENT LABORATORY DATA
03-26    140  |  101  |  12  ----------------------------<  296<H>  3.0<L>   |  31  |  0.69    Ca    9.1      26 Mar 2024 16:41  Mg     1.5     03-26    TPro  7.2  /  Alb  3.3  /  TBili  0.4  /  DBili  x   /  AST  37  /  ALT  27  /  AlkPhos  88  03-25  POCT Blood Glucose.: 320 mg/dL (03-27-24 @ 07:51)  A1C with Estimated Average Glucose Result: 11.5 % (03-26-24 @ 08:05)

## 2024-03-27 NOTE — DIETITIAN INITIAL EVALUATION ADULT - ETIOLOGY
Physiological cause (DM) requiring modified CHO intake; hx of excessive CHO intake; chronic steroid use

## 2024-03-27 NOTE — DIETITIAN INITIAL EVALUATION ADULT - WEIGHT FOR BMI (LBS)
240.1 I will SWITCH the dose or number of times a day I take the medications listed below when I get home from the hospital:  None

## 2024-03-27 NOTE — PROGRESS NOTE ADULT - ASSESSMENT
Impression: 48y Female admitted with RIGHT FINGER INFECTION    CELLULITIS      PMH DM (diabetes mellitus)    Lupus    Fibromyalgia    Multiple sclerosis    Diabetes mellitus type II, non insulin dependent  Migraines  ADHD  Bipolar disorder  Pt on 20mg prednisone    Likely infectious flexor tenosynovitis with h/o cat bite, CT findings c/w.        Plan:  - I & D flexor tendon sheath in am  -npo after mn  -cont abx and elevation  -will update siblings

## 2024-03-27 NOTE — DIETITIAN INITIAL EVALUATION ADULT - DIET TYPE
As medically feasible, advance PO diet to consistent CHO (with evening snack) + Glucerna x 1/day (220 kcal & 10 g protein) As medically feasible, advance PO diet to consistent CHO (with no snacks) + Glucerna x 1/day (220 kcal & 10 g protein)

## 2024-03-27 NOTE — PROGRESS NOTE ADULT - ASSESSMENT
Nelda Ley is a 48 year old female with PMHx of bipolar disorder, multiple sclerosis, lupus, NIDDM2, chronic migraines, ADHD, and fibromyalgia who presented to the ED on 3/25/24 for complaints of cat bite and admitted for R. 4th finger cellulitis and open wounds secondary to cat bite, need to r/o tendon injury.     R. 4th finger cellulitis and open wounds secondary to cat bite.  Sustained cat bite on 3/17/24, took 7-day course of levofloxacin 750 mg, initially with improvement of erythema and swelling  New serosanguineous drainage and inability to flex or extend R. 4th finger x 3 days  R. hand CT showed acute tenosynovitis right 4th finger tendon.   S/p 1L NS bolus, ceftriaxone 1 g IV, and metronidazole 500 mg IV in the ED  Tetanus vaccine ordered  cont iv vancomycin and oral flagyl. Check vancomycin level (needs therapeutic drug level monitoring).   Follow cultures.   Consulted and discussed with Dr Parra. Discussed with patient about PCN allergy. patient reported angioedema with PCN when she was young  Consulted and discussed with Dr. Fernandez. NPO after MN for possible I and D tomorrow.     Hypokalemia  Replete and recheck.     Chronic medical conditions:  Depression: PTA paroxetine 40 mg  Bipolar disorder: PTA oxcarbazepine 600 mg BID, aripiprazole 2 mg qhs  Multiple sclerosis: PTA prednisone 20 mg  NIDDM2 with hyperglycemia: start lantus and premeal insulin. Follow finger sticks.   Chronic migraines: PTA sumatriptan 100 mg PRN  ADHD: PTA Adderall 20 mg BID  Fibromyalgia: PTA gabapentin 400 mg BID, morphine ER 15 mg BID, Vicodin 7.5 / 325 mg BID reordered as Percocet 5 / 325 mg BID given Vicodin not on formulary    DVT ppx: Low risk  FC  Dispo: Medically not ready  Time spent by me managing the patient including but not limited to reviewing the chart, discussion with the nurse and Family, physical exam and assessment and plan is 54 mins

## 2024-03-27 NOTE — DIETITIAN INITIAL EVALUATION ADULT - PERTINENT MEDS FT
MEDICATIONS  (STANDING):  amphetamine/dextroamphetamine 20 milliGRAM(s) Oral two times a day  ARIPiprazole 2 milliGRAM(s) Oral at bedtime  bacitracin   Ointment 1 Application(s) Topical every 12 hours  dextrose 5%. 1000 milliLiter(s) (100 mL/Hr) IV Continuous <Continuous>  dextrose 5%. 1000 milliLiter(s) (50 mL/Hr) IV Continuous <Continuous>  dextrose 50% Injectable 25 Gram(s) IV Push once  dextrose 50% Injectable 12.5 Gram(s) IV Push once  dextrose 50% Injectable 25 Gram(s) IV Push once  diphtheria/tetanus/pertussis (acellular) Vaccine (Adacel) 0.5 milliLiter(s) IntraMuscular once  furosemide    Tablet 20 milliGRAM(s) Oral daily  gabapentin 400 milliGRAM(s) Oral two times a day  glucagon  Injectable 1 milliGRAM(s) IntraMuscular once  insulin glargine Injectable (LANTUS) 10 Unit(s) SubCutaneous every morning  insulin lispro (ADMELOG) corrective regimen sliding scale   SubCutaneous three times a day before meals  insulin lispro (ADMELOG) corrective regimen sliding scale   SubCutaneous at bedtime  insulin lispro Injectable (ADMELOG) 3 Unit(s) SubCutaneous three times a day before meals  loratadine 10 milliGRAM(s) Oral daily  metoprolol succinate ER 25 milliGRAM(s) Oral daily  metroNIDAZOLE  IVPB 500 milliGRAM(s) IV Intermittent every 8 hours  montelukast 10 milliGRAM(s) Oral at bedtime  morphine ER Tablet 15 milliGRAM(s) Oral two times a day  OXcarbazepine 600 milliGRAM(s) Oral two times a day  oxycodone    5 mG/acetaminophen 325 mG 1 Tablet(s) Oral every 12 hours  pantoprazole    Tablet 40 milliGRAM(s) Oral two times a day  PARoxetine 40 milliGRAM(s) Oral at bedtime  predniSONE   Tablet 20 milliGRAM(s) Oral at bedtime  vancomycin  IVPB 1000 milliGRAM(s) IV Intermittent every 8 hours    MEDICATIONS  (PRN):  acetaminophen     Tablet .. 650 milliGRAM(s) Oral every 6 hours PRN Temp greater or equal to 38C (100.4F), Mild Pain (1 - 3)  aluminum hydroxide/magnesium hydroxide/simethicone Suspension 30 milliLiter(s) Oral every 4 hours PRN Dyspepsia  cyclobenzaprine 10 milliGRAM(s) Oral daily PRN Muscle Spasm  dextrose Oral Gel 15 Gram(s) Oral once PRN Blood Glucose LESS THAN 70 milliGRAM(s)/deciliter  melatonin 3 milliGRAM(s) Oral at bedtime PRN Insomnia  ondansetron Injectable 4 milliGRAM(s) IV Push every 8 hours PRN Nausea and/or Vomiting  SUMAtriptan 100 milliGRAM(s) Oral daily PRN migraines  traZODone 50 milliGRAM(s) Oral at bedtime PRN for insomnia

## 2024-03-28 LAB
ANION GAP SERPL CALC-SCNC: 5 MMOL/L — SIGNIFICANT CHANGE UP (ref 5–17)
BUN SERPL-MCNC: 11 MG/DL — SIGNIFICANT CHANGE UP (ref 7–23)
CALCIUM SERPL-MCNC: 9.1 MG/DL — SIGNIFICANT CHANGE UP (ref 8.5–10.1)
CHLORIDE SERPL-SCNC: 101 MMOL/L — SIGNIFICANT CHANGE UP (ref 96–108)
CO2 SERPL-SCNC: 32 MMOL/L — HIGH (ref 22–31)
CREAT SERPL-MCNC: 0.7 MG/DL — SIGNIFICANT CHANGE UP (ref 0.5–1.3)
EGFR: 107 ML/MIN/1.73M2 — SIGNIFICANT CHANGE UP
GLUCOSE BLDC GLUCOMTR-MCNC: 127 MG/DL — HIGH (ref 70–99)
GLUCOSE BLDC GLUCOMTR-MCNC: 153 MG/DL — HIGH (ref 70–99)
GLUCOSE BLDC GLUCOMTR-MCNC: 227 MG/DL — HIGH (ref 70–99)
GLUCOSE BLDC GLUCOMTR-MCNC: 273 MG/DL — HIGH (ref 70–99)
GLUCOSE BLDC GLUCOMTR-MCNC: 292 MG/DL — HIGH (ref 70–99)
GLUCOSE BLDC GLUCOMTR-MCNC: 349 MG/DL — HIGH (ref 70–99)
GLUCOSE SERPL-MCNC: 306 MG/DL — HIGH (ref 70–99)
HCT VFR BLD CALC: 36.6 % — SIGNIFICANT CHANGE UP (ref 34.5–45)
HGB BLD-MCNC: 11.8 G/DL — SIGNIFICANT CHANGE UP (ref 11.5–15.5)
MCHC RBC-ENTMCNC: 28.5 PG — SIGNIFICANT CHANGE UP (ref 27–34)
MCHC RBC-ENTMCNC: 32.2 G/DL — SIGNIFICANT CHANGE UP (ref 32–36)
MCV RBC AUTO: 88.4 FL — SIGNIFICANT CHANGE UP (ref 80–100)
NRBC # BLD: 0 /100 WBCS — SIGNIFICANT CHANGE UP (ref 0–0)
PLATELET # BLD AUTO: 228 K/UL — SIGNIFICANT CHANGE UP (ref 150–400)
POTASSIUM SERPL-MCNC: 3.2 MMOL/L — LOW (ref 3.5–5.3)
POTASSIUM SERPL-SCNC: 3.2 MMOL/L — LOW (ref 3.5–5.3)
RBC # BLD: 4.14 M/UL — SIGNIFICANT CHANGE UP (ref 3.8–5.2)
RBC # FLD: 13.7 % — SIGNIFICANT CHANGE UP (ref 10.3–14.5)
SODIUM SERPL-SCNC: 138 MMOL/L — SIGNIFICANT CHANGE UP (ref 135–145)
VANCOMYCIN TROUGH SERPL-MCNC: 16.8 UG/ML — SIGNIFICANT CHANGE UP (ref 10–20)
WBC # BLD: 7.56 K/UL — SIGNIFICANT CHANGE UP (ref 3.8–10.5)
WBC # FLD AUTO: 7.56 K/UL — SIGNIFICANT CHANGE UP (ref 3.8–10.5)

## 2024-03-28 PROCEDURE — 99232 SBSQ HOSP IP/OBS MODERATE 35: CPT

## 2024-03-28 PROCEDURE — 99222 1ST HOSP IP/OBS MODERATE 55: CPT

## 2024-03-28 PROCEDURE — ZZZZZ: CPT

## 2024-03-28 PROCEDURE — 88304 TISSUE EXAM BY PATHOLOGIST: CPT | Mod: 26

## 2024-03-28 RX ORDER — DEXTROSE 50 % IN WATER 50 %
12.5 SYRINGE (ML) INTRAVENOUS ONCE
Refills: 0 | Status: DISCONTINUED | OUTPATIENT
Start: 2024-03-28 | End: 2024-03-31

## 2024-03-28 RX ORDER — LANOLIN ALCOHOL/MO/W.PET/CERES
3 CREAM (GRAM) TOPICAL AT BEDTIME
Refills: 0 | Status: DISCONTINUED | OUTPATIENT
Start: 2024-03-28 | End: 2024-03-31

## 2024-03-28 RX ORDER — INSULIN GLARGINE 100 [IU]/ML
10 INJECTION, SOLUTION SUBCUTANEOUS EVERY MORNING
Refills: 0 | Status: DISCONTINUED | OUTPATIENT
Start: 2024-03-28 | End: 2024-03-29

## 2024-03-28 RX ORDER — MORPHINE SULFATE 50 MG/1
2 CAPSULE, EXTENDED RELEASE ORAL
Refills: 0 | Status: DISCONTINUED | OUTPATIENT
Start: 2024-03-28 | End: 2024-03-28

## 2024-03-28 RX ORDER — CYCLOBENZAPRINE HYDROCHLORIDE 10 MG/1
10 TABLET, FILM COATED ORAL DAILY
Refills: 0 | Status: DISCONTINUED | OUTPATIENT
Start: 2024-03-28 | End: 2024-03-31

## 2024-03-28 RX ORDER — SUMATRIPTAN SUCCINATE 4 MG/.5ML
100 INJECTION, SOLUTION SUBCUTANEOUS DAILY
Refills: 0 | Status: DISCONTINUED | OUTPATIENT
Start: 2024-03-28 | End: 2024-03-31

## 2024-03-28 RX ORDER — LORATADINE 10 MG/1
10 TABLET ORAL DAILY
Refills: 0 | Status: DISCONTINUED | OUTPATIENT
Start: 2024-03-28 | End: 2024-03-31

## 2024-03-28 RX ORDER — FUROSEMIDE 40 MG
20 TABLET ORAL DAILY
Refills: 0 | Status: DISCONTINUED | OUTPATIENT
Start: 2024-03-28 | End: 2024-03-31

## 2024-03-28 RX ORDER — TETANUS TOXOID, REDUCED DIPHTHERIA TOXOID AND ACELLULAR PERTUSSIS VACCINE, ADSORBED 5; 2.5; 8; 8; 2.5 [IU]/.5ML; [IU]/.5ML; UG/.5ML; UG/.5ML; UG/.5ML
0.5 SUSPENSION INTRAMUSCULAR ONCE
Refills: 0 | Status: DISCONTINUED | OUTPATIENT
Start: 2024-03-28 | End: 2024-03-31

## 2024-03-28 RX ORDER — ONDANSETRON 8 MG/1
4 TABLET, FILM COATED ORAL ONCE
Refills: 0 | Status: COMPLETED | OUTPATIENT
Start: 2024-03-28 | End: 2024-03-28

## 2024-03-28 RX ORDER — INSULIN LISPRO 100/ML
VIAL (ML) SUBCUTANEOUS
Refills: 0 | Status: DISCONTINUED | OUTPATIENT
Start: 2024-03-28 | End: 2024-03-31

## 2024-03-28 RX ORDER — POTASSIUM CHLORIDE 20 MEQ
20 PACKET (EA) ORAL
Refills: 0 | Status: DISCONTINUED | OUTPATIENT
Start: 2024-03-28 | End: 2024-03-28

## 2024-03-28 RX ORDER — OXCARBAZEPINE 300 MG/1
600 TABLET, FILM COATED ORAL
Refills: 0 | Status: DISCONTINUED | OUTPATIENT
Start: 2024-03-28 | End: 2024-03-31

## 2024-03-28 RX ORDER — ACETAMINOPHEN 500 MG
650 TABLET ORAL EVERY 6 HOURS
Refills: 0 | Status: DISCONTINUED | OUTPATIENT
Start: 2024-03-28 | End: 2024-03-31

## 2024-03-28 RX ORDER — TRAZODONE HCL 50 MG
50 TABLET ORAL AT BEDTIME
Refills: 0 | Status: DISCONTINUED | OUTPATIENT
Start: 2024-03-28 | End: 2024-03-31

## 2024-03-28 RX ORDER — DEXTROSE 50 % IN WATER 50 %
15 SYRINGE (ML) INTRAVENOUS ONCE
Refills: 0 | Status: DISCONTINUED | OUTPATIENT
Start: 2024-03-28 | End: 2024-03-31

## 2024-03-28 RX ORDER — MORPHINE SULFATE 50 MG/1
15 CAPSULE, EXTENDED RELEASE ORAL
Refills: 0 | Status: DISCONTINUED | OUTPATIENT
Start: 2024-03-28 | End: 2024-03-31

## 2024-03-28 RX ORDER — ALBUTEROL 90 UG/1
2.5 AEROSOL, METERED ORAL ONCE
Refills: 0 | Status: DISCONTINUED | OUTPATIENT
Start: 2024-03-28 | End: 2024-03-31

## 2024-03-28 RX ORDER — SODIUM CHLORIDE 9 MG/ML
1000 INJECTION, SOLUTION INTRAVENOUS
Refills: 0 | Status: DISCONTINUED | OUTPATIENT
Start: 2024-03-28 | End: 2024-03-31

## 2024-03-28 RX ORDER — DEXTROSE 50 % IN WATER 50 %
25 SYRINGE (ML) INTRAVENOUS ONCE
Refills: 0 | Status: DISCONTINUED | OUTPATIENT
Start: 2024-03-28 | End: 2024-03-31

## 2024-03-28 RX ORDER — MONTELUKAST 4 MG/1
10 TABLET, CHEWABLE ORAL AT BEDTIME
Refills: 0 | Status: DISCONTINUED | OUTPATIENT
Start: 2024-03-28 | End: 2024-03-31

## 2024-03-28 RX ORDER — POTASSIUM CHLORIDE 20 MEQ
10 PACKET (EA) ORAL
Refills: 0 | Status: DISCONTINUED | OUTPATIENT
Start: 2024-03-28 | End: 2024-03-28

## 2024-03-28 RX ORDER — ARIPIPRAZOLE 15 MG/1
2 TABLET ORAL AT BEDTIME
Refills: 0 | Status: DISCONTINUED | OUTPATIENT
Start: 2024-03-28 | End: 2024-03-31

## 2024-03-28 RX ORDER — BENZOCAINE AND MENTHOL 5; 1 G/100ML; G/100ML
1 LIQUID ORAL
Refills: 0 | Status: DISCONTINUED | OUTPATIENT
Start: 2024-03-28 | End: 2024-03-31

## 2024-03-28 RX ORDER — GLUCAGON INJECTION, SOLUTION 0.5 MG/.1ML
1 INJECTION, SOLUTION SUBCUTANEOUS ONCE
Refills: 0 | Status: DISCONTINUED | OUTPATIENT
Start: 2024-03-28 | End: 2024-03-31

## 2024-03-28 RX ORDER — METOPROLOL TARTRATE 50 MG
25 TABLET ORAL DAILY
Refills: 0 | Status: DISCONTINUED | OUTPATIENT
Start: 2024-03-28 | End: 2024-03-31

## 2024-03-28 RX ORDER — HYDROMORPHONE HYDROCHLORIDE 2 MG/ML
0.5 INJECTION INTRAMUSCULAR; INTRAVENOUS; SUBCUTANEOUS
Refills: 0 | Status: DISCONTINUED | OUTPATIENT
Start: 2024-03-28 | End: 2024-03-28

## 2024-03-28 RX ORDER — GABAPENTIN 400 MG/1
400 CAPSULE ORAL
Refills: 0 | Status: DISCONTINUED | OUTPATIENT
Start: 2024-03-28 | End: 2024-03-31

## 2024-03-28 RX ORDER — INSULIN LISPRO 100/ML
VIAL (ML) SUBCUTANEOUS AT BEDTIME
Refills: 0 | Status: DISCONTINUED | OUTPATIENT
Start: 2024-03-28 | End: 2024-03-31

## 2024-03-28 RX ORDER — PANTOPRAZOLE SODIUM 20 MG/1
40 TABLET, DELAYED RELEASE ORAL
Refills: 0 | Status: DISCONTINUED | OUTPATIENT
Start: 2024-03-28 | End: 2024-03-31

## 2024-03-28 RX ADMIN — LORATADINE 10 MILLIGRAM(S): 10 TABLET ORAL at 12:39

## 2024-03-28 RX ADMIN — Medication 100 MILLIEQUIVALENT(S): at 12:27

## 2024-03-28 RX ADMIN — ARIPIPRAZOLE 2 MILLIGRAM(S): 15 TABLET ORAL at 22:42

## 2024-03-28 RX ADMIN — Medication 40 MILLIGRAM(S): at 22:41

## 2024-03-28 RX ADMIN — MONTELUKAST 10 MILLIGRAM(S): 4 TABLET, CHEWABLE ORAL at 22:41

## 2024-03-28 RX ADMIN — Medication 1 APPLICATION(S): at 06:33

## 2024-03-28 RX ADMIN — GABAPENTIN 400 MILLIGRAM(S): 400 CAPSULE ORAL at 22:42

## 2024-03-28 RX ADMIN — OXCARBAZEPINE 600 MILLIGRAM(S): 300 TABLET, FILM COATED ORAL at 06:24

## 2024-03-28 RX ADMIN — Medication 20 MILLIGRAM(S): at 06:24

## 2024-03-28 RX ADMIN — Medication 500 MILLIGRAM(S): at 00:25

## 2024-03-28 RX ADMIN — Medication 2: at 12:09

## 2024-03-28 RX ADMIN — ONDANSETRON 4 MILLIGRAM(S): 8 TABLET, FILM COATED ORAL at 19:40

## 2024-03-28 RX ADMIN — INSULIN GLARGINE 10 UNIT(S): 100 INJECTION, SOLUTION SUBCUTANEOUS at 08:33

## 2024-03-28 RX ADMIN — Medication 25 MILLIGRAM(S): at 06:26

## 2024-03-28 RX ADMIN — GABAPENTIN 400 MILLIGRAM(S): 400 CAPSULE ORAL at 06:24

## 2024-03-28 RX ADMIN — Medication 4: at 08:34

## 2024-03-28 RX ADMIN — Medication 500 MILLIGRAM(S): at 08:32

## 2024-03-28 RX ADMIN — OXYCODONE AND ACETAMINOPHEN 1 TABLET(S): 5; 325 TABLET ORAL at 12:10

## 2024-03-28 RX ADMIN — Medication 20 MILLIGRAM(S): at 22:42

## 2024-03-28 RX ADMIN — Medication 1: at 22:54

## 2024-03-28 RX ADMIN — OXYCODONE AND ACETAMINOPHEN 1 TABLET(S): 5; 325 TABLET ORAL at 11:07

## 2024-03-28 RX ADMIN — Medication 3 UNIT(S): at 12:14

## 2024-03-28 RX ADMIN — Medication 250 MILLIGRAM(S): at 06:32

## 2024-03-28 RX ADMIN — MORPHINE SULFATE 15 MILLIGRAM(S): 50 CAPSULE, EXTENDED RELEASE ORAL at 06:30

## 2024-03-28 RX ADMIN — PANTOPRAZOLE SODIUM 40 MILLIGRAM(S): 20 TABLET, DELAYED RELEASE ORAL at 06:26

## 2024-03-28 NOTE — CONSULT NOTE ADULT - REASON FOR ADMISSION
R. 4th finger cellulitis and open wounds secondary to cat bite, need to r/o tendon injury
R. 4th finger cellulitis and open wounds secondary to cat bite, need to r/o tendon injury

## 2024-03-28 NOTE — BRIEF OPERATIVE NOTE - NSICDXBRIEFPROCEDURE_GEN_ALL_CORE_FT
PROCEDURES:  Incision and drainage, wound, finger 28-Mar-2024 15:55:27 4th digit of right hand Luciana Cramer  Exploration of tendon sheath of hand 28-Mar-2024 16:04:58  Luciana Cramer

## 2024-03-28 NOTE — PROGRESS NOTE ADULT - ASSESSMENT
Nelda Ley is a 48 year old female with PMHx of bipolar disorder, multiple sclerosis, lupus, NIDDM2, chronic migraines, ADHD, and fibromyalgia who presented to the ED on 3/25/24 for complaints of cat bite and admitted for R. 4th finger cellulitis and open wounds secondary to cat bite, need to r/o tendon injury.     R. 4th finger cellulitis and open wounds secondary to cat bite.  Sustained cat bite on 3/17/24, took 7-day course of levofloxacin 750 mg, initially with improvement of erythema and swelling  New serosanguineous drainage and inability to flex or extend R. 4th finger x 3 days  R. hand CT showed acute tenosynovitis right 4th finger tendon.   S/p 1L NS bolus, ceftriaxone 1 g IV, and metronidazole 500 mg IV in the ED  Tetanus vaccine ordered  I and D today for flexor tenosynovitis 4 th right ring finger. Follow post procedure recs.   cont current antibiotic.   ID and plastic surgery following.   cont current pain control.     Hypokalemia  Replete again with iv KCl and recheck.     Chronic medical conditions:  Depression: PTA paroxetine 40 mg  Bipolar disorder: PTA oxcarbazepine 600 mg BID, aripiprazole 2 mg qhs  Multiple sclerosis: PTA prednisone 20 mg  NIDDM2 with hyperglycemia: better controlled. Cont lantus and premeal insulin. Follow finger sticks.   Chronic migraines: PTA sumatriptan 100 mg PRN  ADHD: PTA Adderall 20 mg BID  Fibromyalgia: PTA gabapentin 400 mg BID, morphine ER 15 mg BID, Vicodin 7.5 / 325 mg BID reordered as Percocet 5 / 325 mg BID given Vicodin not on formulary    DVT ppx: Low risk  FC  Dispo: Medically not ready. Follow tissue culture results.      Nelda Ley is a 48 year old female with PMHx of bipolar disorder, multiple sclerosis, lupus, NIDDM2, chronic migraines, ADHD, and fibromyalgia who presented to the ED on 3/25/24 for complaints of cat bite and admitted for R. 4th finger cellulitis and open wounds secondary to cat bite, need to r/o tendon injury.     R. 4th finger cellulitis and open wounds secondary to cat bite.  Sustained cat bite on 3/17/24, took 7-day course of levofloxacin 750 mg, initially with improvement of erythema and swelling  New serosanguineous drainage and inability to flex or extend R. 4th finger x 3 days  R. hand CT showed acute tenosynovitis right 4th finger tendon.   S/p 1L NS bolus, ceftriaxone 1 g IV, and metronidazole 500 mg IV in the ED  Tetanus vaccine ordered  I and D today for flexor tenosynovitis 4 th right ring finger. Follow post procedure recs.   cont current antibiotic.   ID and plastic surgery following.   cont current pain control.     Hypokalemia  Replete again with iv KCl and recheck.     Chronic medical conditions:  Depression: PTA paroxetine 40 mg  Bipolar disorder: PTA oxcarbazepine 600 mg BID, aripiprazole 2 mg qhs  Multiple sclerosis: PTA prednisone 20 mg  NIDDM2 with hyperglycemia: better controlled. Cont lantus and premeal insulin. Follow finger sticks.   Chronic migraines: PTA sumatriptan 100 mg PRN  ADHD: PTA Adderall 20 mg BID  Fibromyalgia: cont current management.     DVT ppx: Low risk  FC  Dispo: Medically not ready. Follow tissue culture results.

## 2024-03-28 NOTE — BRIEF OPERATIVE NOTE - FIRST ASSIST PARTICIPATION DETAILS - (COMPONENTS OF THE PROCEDURE THAT ASSISTANT PARTICIPATED IN)
I acted within this role through the second portion of the procedure performed by the primary surgeon

## 2024-03-28 NOTE — PHARMACOTHERAPY INTERVENTION NOTE - COMMENTS
As per policy vancomycin level ordered.    Pt on vancomycin 1000mg q8h with calculated AUC of 431. Therapeutic AUC range is 400-600. Recommend to continue current dose and check a second trough before the next dose. 
Recommended ceftriaxone 1g (covers Pasteurella multocida) and metronidazole 500mg for treatment of cat bite 1 week ago on 4th finger (abscess). Of note, patient failed outpatient levofloxacin treatment and has allergies to penicillin (angioedema), clindamycin (rash), ciprofloxacin (rash).
As per policy switched metronidazole IV to PO.

## 2024-03-28 NOTE — CONSULT NOTE ADULT - SUBJECTIVE AND OBJECTIVE BOX
[facility]  INFECTIOUS DISEASES   [address] [place] [address]  Tel: [phone]     Fax: [phone]  ==============================================================================  DO Monie Rodrigez MD Alexandra Gutman, NP   ==============================================================================    AIME ERWIN  [id number]  Female  48y ([birth year])        Patient is a 48y old  Female who presents with a chief complaint of R. 4th finger cellulitis and open wounds secondary to cat bite, need to r/o tendon injury (28 Mar 2024 15:58)      HPI:  Aime Erwin is a 48 year old female with PMHx of bipolar disorder, multiple sclerosis, lupus, NIDDM2, chronic migraines, ADHD, and fibromyalgia who presented to the ED on 3/25/24 for complaints of cat bite.     Patient reports her cat bit her right ring finger on 4/17/24. She called her PCP who sent in a prescription for levofloxacin 750 mg. Took a total of 7 day course of antibiotics. States it looks like her finger was looking better but then suddenly got worse three days ago. Described as serosanguineous drainage. Then developed inability to flex right ring finger. Went to urgent care today where pus was expressed from wound. Sent to the ER for further evaluation.    In the ED, VSS. CBC unremarkable. Potassium 3.3, blood glucose 233, ESR 38. Right hand x-ray with swelling of fourth digit. Received 1L NS bolus, ceftriaxone 1 g IV, metronidazole 500 mg IV, and KCl 40 mEq. (25 Mar 2024 22:18)      ID consulted for workup and antibiotic management     PAST MEDICAL & SURGICAL HISTORY:  Lupus      Fibromyalgia      Multiple sclerosis      Diabetes mellitus type II, non insulin dependent      Migraines      ADHD      Bipolar disorder          Allergies  Cipro (Rash)  Prozac (Angioedema)  contrast media (iodine-based) (Angioedema)  penicillin (Angioedema)  Diflucan (Stomach Upset)  clindamycin (Rash)  Celebrex (Rash)        ANTIMICROBIALS:      MEDICATIONS  (STANDING):  cefTRIAXone   IVPB   100 mL/Hr IV Intermittent (03-25-24 @ 16:19)    doxycycline IVPB   110 mL/Hr IV Intermittent (03-26-24 @ 12:37)   110 mL/Hr IV Intermittent (03-25-24 @ 23:03)    metroNIDAZOLE    Tablet   500 milliGRAM(s) Oral (03-28-24 @ 08:32)   500 milliGRAM(s) Oral (03-28-24 @ 00:25)   500 milliGRAM(s) Oral (03-27-24 @ 16:55)    metroNIDAZOLE  IVPB   100 mL/Hr IV Intermittent (03-27-24 @ 08:00)   100 mL/Hr IV Intermittent (03-27-24 @ 00:01)   100 mL/Hr IV Intermittent (03-26-24 @ 16:20)   100 mL/Hr IV Intermittent (03-26-24 @ 08:47)   100 mL/Hr IV Intermittent (03-26-24 @ 00:14)    metroNIDAZOLE  IVPB   100 mL/Hr IV Intermittent (03-25-24 @ 16:37)    vancomycin  IVPB   250 mL/Hr IV Intermittent (03-28-24 @ 06:32)   250 mL/Hr IV Intermittent (03-27-24 @ 22:39)   250 mL/Hr IV Intermittent (03-27-24 @ 14:58)   250 mL/Hr IV Intermittent (03-27-24 @ 06:20)   250 mL/Hr IV Intermittent (03-26-24 @ 21:53)        OTHER MEDS: MEDICATIONS  (STANDING):  albuterol    0.083%. 2.5 once PRN  HYDROmorphone  Injectable 0.5 every 10 minutes PRN  morphine  - Injectable 2 every 15 minutes PRN  ondansetron Injectable 4 once PRN      SOCIAL HISTORY:     Smoking Cigarettes [ ]Active [ ] Former [ ]Denies   ETOH [ ]denies [ ]Former [ ]Current Use denies   Drug Use [ ]Never [ ] Former [ ] Active     FAMILY HISTORY:  mother recently passed away     REVIEW OF SYSTEMS  [  ] ROS unobtainable because:    [ x ] All other systems negative except as noted below:	    Constitutional:  [ ] fever [ ] chills  [ ] weight loss  [ ] weakness  Skin:  [ ] rash [ ] phlebitis [x[ swollen hand 	  Eyes: [ ] icterus [ ] pain  [ ] discharge	  ENMT: [ ] sore throat  [ ] thrush [ ] ulcers [ ] exudates  Respiratory: [ ] dyspnea [ ] hemoptysis [ ] cough [ ] sputum	  Cardiovascular:  [ ] chest pain [ ] palpitations [ ] edema	  Gastrointestinal:  [ ] nausea [ ] vomiting [ ] diarrhea [ ] constipation [ ] pain	  Genitourinary:  [ ] dysuria [ ] frequency [ ] hematuria [ ] discharge [ ] flank pain  [ ] incontinence  Musculoskeletal:  [ ] myalgias [ ] arthralgias [ ] arthritis  [ ] back pain  Neurological:  [ ] headache [ ] seizures  [ ] confusion/altered mental status  Psychiatric:  [ ] anxiety [ ] depression	  Hematology/Lymphatics:  [ ] lymphadenopathy  Endocrine:  [ ] adrenal [ ] thyroid  Allergic/Immunologic:	 [ ] transplant [ ] seasonal    Vital Signs Last 24 Hrs  T(F): 98.7 (03-28-24 @ 15:50), Max: 98.7 (03-28-24 @ 15:50)    Vital Signs Last 24 Hrs  HR: 81 (03-28-24 @ 17:00) (76 - 95)  BP: 131/80 (03-28-24 @ 17:00) (104/66 - 139/80)  RR: 20 (03-28-24 @ 17:00)  SpO2: 99% (03-28-24 @ 17:00) (93% - 99%)  Wt(kg): --    PHYSICAL EXAM:  Constitutional: non-toxic, no distress, obese   HEAD/EYES: anicteric, no conjunctival injection  ENT:  supple, no thrush  Cardiovascular:   normal S1, S2, no murmur, no edema  Respiratory:  clear BS bilaterally, no wheezes, no rales  GI:  soft, non-tender, normal bowel sounds  :  no viveros, no CVA tenderness  Musculoskeletal:  right hand is fully dressed post-surgery  Neurologic: awake and alert, normal strength, no focal findings  Skin:  no rash, no erythema, no phlebitis, tattoos   Heme/Onc: no lymphadenopathy   Psychiatric:  awake, alert, appropriate mood          WBC Count: 7.56 K/uL (03-28 @ 04:28)  WBC Count: 8.51 K/uL (03-27 @ 08:00)  WBC Count: 7.49 K/uL (03-26 @ 16:41)  WBC Count: 8.90 K/uL (03-25 @ 16:03)      Auto Neutrophil %: 74.2 % (03-27-24 @ 08:00)  Auto Neutrophil #: 6.32 K/uL (03-27-24 @ 08:00)  Auto Neutrophil %: 62.1 % (03-25-24 @ 16:03)  Auto Neutrophil #: 5.52 K/uL (03-25-24 @ 16:03)                            11.8   7.56  )-----------( 228      ( 28 Mar 2024 04:28 )             36.6       03-28    138  |  101  |  11  ----------------------------<  306<H>  3.2<L>   |  32<H>  |  0.70    Ca    9.1      28 Mar 2024 04:28        Creatinine Trend: 0.70<--, 0.69<--, 0.69<--, 0.76<--        MICROBIOLOGY:        Vancomycin Level, Trough: 13.2 ug/mL (03-27-24 @ 21:15)    RADIOLOGY:  < from: CT Hand No Cont, Right (03.27.24 @ 13:50) >  IMPRESSION:    1.  Soft tissue swelling within the ring finger.  2.  Suspect moderate ring finger flexor tenosynovitis prominentat the   middle and proximal phalanges.  3.  No CT evidence for osteomyelitis.    < end of copied text >      < from: Xray Hand 3 Views, Right (03.25.24 @ 15:32) >  IMPRESSION: Swelling of the fourth finger. No acute bony finding.    < end of copied text >    I have personally reviewed the above imaging 
    CC:  Patient is a 48y old  Female who presents with a chief complaint of R. 4th finger cellulitis and open wounds secondary to cat bite, need to r/o tendon injury (26 Mar 2024 14:41)      HPI:  Nelda Ley is a 48 year old female with PMHx of bipolar disorder, multiple sclerosis, lupus, NIDDM2, chronic migraines, ADHD, and fibromyalgia who presented to the ED on 3/25/24 for complaints of cat bite.     Patient reports her cat bit her right ring finger on 4/17/24. She called her PCP who sent in a prescription for levofloxacin 750 mg. Took a total of 7 day course of antibiotics. States it looks like her finger was looking better but then suddenly got worse three days ago. Described as serosanguineous drainage. Then developed inability to flex right ring finger. Went to urgent care today where pus was expressed from wound. Sent to the ER for further evaluation.    In the ED, VSS. CBC unremarkable. Potassium 3.3, blood glucose 233, ESR 38. Right hand x-ray with swelling of fourth digit. Received 1L NS bolus, ceftriaxone 1 g IV, metronidazole 500 mg IV, and KCl 40 mEq. (25 Mar 2024 22:18)      PAST MEDICAL & SURGICAL HISTORY:  Lupus      Fibromyalgia      Multiple sclerosis      Diabetes mellitus type II, non insulin dependent      Migraines      ADHD      Bipolar disorder          Allergies    Cipro (Rash)  Prozac (Angioedema)  contrast media (iodine-based) (Angioedema)  penicillin (Angioedema)  Diflucan (Stomach Upset)  clindamycin (Rash)  Celebrex (Rash)    Intolerances        SOCIAL HISTORY          Smoking: Yes [ ]  No [ ]   ______pk yrs          ETOH  Yes [ ]  No [ ]  Social [ ]          DRUGS:  Yes [ ]  No [ ]  if so what______________    FAMILY HISTORY:      MEDICATIONS  (STANDING):  amphetamine/dextroamphetamine 20 milliGRAM(s) Oral two times a day  ARIPiprazole 2 milliGRAM(s) Oral at bedtime  bacitracin   Ointment 1 Application(s) Topical every 12 hours  dextrose 5%. 1000 milliLiter(s) (50 mL/Hr) IV Continuous <Continuous>  dextrose 5%. 1000 milliLiter(s) (100 mL/Hr) IV Continuous <Continuous>  dextrose 50% Injectable 25 Gram(s) IV Push once  dextrose 50% Injectable 12.5 Gram(s) IV Push once  dextrose 50% Injectable 25 Gram(s) IV Push once  diphtheria/tetanus/pertussis (acellular) Vaccine (Adacel) 0.5 milliLiter(s) IntraMuscular once  furosemide    Tablet 20 milliGRAM(s) Oral daily  gabapentin 400 milliGRAM(s) Oral two times a day  glucagon  Injectable 1 milliGRAM(s) IntraMuscular once  insulin lispro (ADMELOG) corrective regimen sliding scale   SubCutaneous three times a day before meals  insulin lispro (ADMELOG) corrective regimen sliding scale   SubCutaneous at bedtime  loratadine 10 milliGRAM(s) Oral daily  metoprolol succinate ER 25 milliGRAM(s) Oral daily  metroNIDAZOLE  IVPB 500 milliGRAM(s) IV Intermittent every 8 hours  montelukast 10 milliGRAM(s) Oral at bedtime  morphine ER Tablet 15 milliGRAM(s) Oral two times a day  OXcarbazepine 600 milliGRAM(s) Oral two times a day  oxycodone    5 mG/acetaminophen 325 mG 1 Tablet(s) Oral every 12 hours  pantoprazole    Tablet 40 milliGRAM(s) Oral two times a day  PARoxetine 40 milliGRAM(s) Oral at bedtime  potassium chloride   Powder 40 milliEquivalent(s) Oral every 4 hours  predniSONE   Tablet 20 milliGRAM(s) Oral at bedtime  vancomycin  IVPB 1000 milliGRAM(s) IV Intermittent every 8 hours    MEDICATIONS  (PRN):  acetaminophen     Tablet .. 650 milliGRAM(s) Oral every 6 hours PRN Temp greater or equal to 38C (100.4F), Mild Pain (1 - 3)  aluminum hydroxide/magnesium hydroxide/simethicone Suspension 30 milliLiter(s) Oral every 4 hours PRN Dyspepsia  cyclobenzaprine 10 milliGRAM(s) Oral daily PRN Muscle Spasm  dextrose Oral Gel 15 Gram(s) Oral once PRN Blood Glucose LESS THAN 70 milliGRAM(s)/deciliter  melatonin 3 milliGRAM(s) Oral at bedtime PRN Insomnia  ondansetron Injectable 4 milliGRAM(s) IV Push every 8 hours PRN Nausea and/or Vomiting  SUMAtriptan 100 milliGRAM(s) Oral daily PRN migraines  traZODone 50 milliGRAM(s) Oral at bedtime PRN for insomnia         Review of systems:  General:  no fever or chills  Pulmonary:  no SOB  Cardiac:  denies chest pain, palpitations  GI:  no nausea, vomitting, or abddominal pain  :  no increase frequency, or burning  Heme:  no easy bruising with minor trauma  Musculoskeletal:  No history of back pain or trauma  Skin:  no history of rashes, injury, trauma  Neuro:  no weakness         Vital Signs Last 24 Hrs  T(C): 36.7 (26 Mar 2024 17:30), Max: 37 (26 Mar 2024 11:02)  T(F): 98 (26 Mar 2024 17:30), Max: 98.6 (26 Mar 2024 11:02)  HR: 82 (26 Mar 2024 17:30) (77 - 84)  BP: 126/84 (26 Mar 2024 17:30) (123/81 - 150/86)  BP(mean): --  RR: 18 (26 Mar 2024 17:30) (17 - 18)  SpO2: 95% (26 Mar 2024 17:30) (95% - 98%)    Parameters below as of 26 Mar 2024 17:30  Patient On (Oxygen Delivery Method): room air        Physical Exam:     General: comfortable, no acute distress  HEENT: Atraumatic, no LAD, trachea midline, PERRLA  Cardiovascular: normal s1s2, no murmurs, gallops or fricition rubs  Pulmonary: clear to ausculation Bilaterally, no wheezing , rhonchi  Gastrointestinal: soft non tender non distended, no masses felt, no organomegally  Muscloskeletal: no lower extremity edema, intact bilateral lower extremity pulses  Neurological: CN II-12 intact. No focal weakness  Psychiatrical: normal mood, cooperative  SKIN: no rash, lesions or ulcers    Extremities:  right ring finger edema and tenderness especially prox phal wound ulnar side, no draining no significant hand edema and streaking, neurovasc intact       LABS:                        11.3   7.49  )-----------( 242      ( 26 Mar 2024 16:41 )             34.8     03-26    140  |  101  |  12  ----------------------------<  296<H>  3.0<L>   |  31  |  0.69    Ca    9.1      26 Mar 2024 16:41  Mg     1.5     03-26    TPro  7.2  /  Alb  3.3  /  TBili  0.4  /  DBili  x   /  AST  37  /  ALT  27  /  AlkPhos  88  03-25      Urinalysis Basic - ( 26 Mar 2024 16:41 )    Color: x / Appearance: x / SG: x / pH: x  Gluc: 296 mg/dL / Ketone: x  / Bili: x / Urobili: x   Blood: x / Protein: x / Nitrite: x   Leuk Esterase: x / RBC: x / WBC x   Sq Epi: x / Non Sq Epi: x / Bacteria: x        RADIOLOGY & ADDITIONAL STUDIES:  ACC: [id number] EXAM:  XR HAND MIN 3 VIEWS RT   ORDERED BY: IRVING NUÑEZ     PROCEDURE DATE:  03/25/2024          INTERPRETATION:  Right hand. Patient has swelling of the fourth finger. 3   views. Patient had a cat bite.    There is slight scattered IP degeneration. No bone destruction or   fracture is evident.    There is swelling of the fourth finger.    IMPRESSION: Swelling of the fourth finger. No acute bony finding.    --- End of Report ---            CORIN OVALLE MD; Attending Radiologist  This document has been electronically signed. Mar 25 2024  4:35PM    Risks, benefits, and alternatives to treatment discussed. All questions answered with understanding.    Procedure Performed:  (  )Yes     (x  )No  Name of Procedure:      [  ]Debridement     [  ]I&D    [  ]Laceration Repair     [  ]Other:  (  )partial thickness     (  )full thickness     (  )subcutaneous     (  )muscle/tendon     (  )bone  (  )sharp     (  )surgical

## 2024-03-28 NOTE — CONSULT NOTE ADULT - ASSESSMENT
right ring finger infection, possible abscess, possible flexor tenosynovitis.  MRI was able to be done. CT ordered  CT should be done with IV contrast  -elevation  -cont antibiotics  -discussed possible OR for  I&D/drainage flexor tendon sheet  -need CT right hand and fingers with and without IV contrast prior to OR  -npo after midnight.
48F who sustained a cat bite  has  multiple allergies   unclear which is real and which was though t to be from one med but was actually from another   ideally want unasyn for pastaurella coverage but due to penicillin allergy and report of angioedema ok to use Doxycycline 100mg 2 times a day   will follow cultures to help target our antibiotics therapy     Plan:  stop vancomycin and flagyl  start Doxycycline 100mg 2 times a day   Doxycycline to be taken sitting up without lying down for 30 min, ideal to take with food   Follow OR cultures   good but not too tight glycemic control  send HIV     Discussed with Dr. Aden Zuniga, DO  Chief, Infectious Disease at [facility]  Reachable via Microsoft Teams or ID office: [phone]  Weekdays: After 5pm, please call [phone] for all inquiries and new consults  Weekends: Message on-call infectious disease physician via teams (see Darion)

## 2024-03-29 ENCOUNTER — TRANSCRIPTION ENCOUNTER (OUTPATIENT)
Age: 49
End: 2024-03-29

## 2024-03-29 DIAGNOSIS — Z63.4 DISAPPEARANCE AND DEATH OF FAMILY MEMBER: ICD-10-CM

## 2024-03-29 LAB
ANION GAP SERPL CALC-SCNC: 11 MMOL/L — SIGNIFICANT CHANGE UP (ref 5–17)
BUN SERPL-MCNC: 11 MG/DL — SIGNIFICANT CHANGE UP (ref 7–23)
CALCIUM SERPL-MCNC: 8.6 MG/DL — SIGNIFICANT CHANGE UP (ref 8.5–10.1)
CHLORIDE SERPL-SCNC: 102 MMOL/L — SIGNIFICANT CHANGE UP (ref 96–108)
CO2 SERPL-SCNC: 28 MMOL/L — SIGNIFICANT CHANGE UP (ref 22–31)
CREAT SERPL-MCNC: 0.7 MG/DL — SIGNIFICANT CHANGE UP (ref 0.5–1.3)
EGFR: 107 ML/MIN/1.73M2 — SIGNIFICANT CHANGE UP
GLUCOSE BLDC GLUCOMTR-MCNC: 179 MG/DL — HIGH (ref 70–99)
GLUCOSE BLDC GLUCOMTR-MCNC: 208 MG/DL — HIGH (ref 70–99)
GLUCOSE BLDC GLUCOMTR-MCNC: 261 MG/DL — HIGH (ref 70–99)
GLUCOSE BLDC GLUCOMTR-MCNC: 297 MG/DL — HIGH (ref 70–99)
GLUCOSE SERPL-MCNC: 297 MG/DL — HIGH (ref 70–99)
POTASSIUM SERPL-MCNC: 2.9 MMOL/L — CRITICAL LOW (ref 3.5–5.3)
POTASSIUM SERPL-SCNC: 2.9 MMOL/L — CRITICAL LOW (ref 3.5–5.3)
SODIUM SERPL-SCNC: 141 MMOL/L — SIGNIFICANT CHANGE UP (ref 135–145)

## 2024-03-29 PROCEDURE — 99232 SBSQ HOSP IP/OBS MODERATE 35: CPT

## 2024-03-29 PROCEDURE — 99221 1ST HOSP IP/OBS SF/LOW 40: CPT

## 2024-03-29 RX ORDER — POTASSIUM CHLORIDE 20 MEQ
40 PACKET (EA) ORAL EVERY 4 HOURS
Refills: 0 | Status: COMPLETED | OUTPATIENT
Start: 2024-03-29 | End: 2024-03-29

## 2024-03-29 RX ORDER — INSULIN GLARGINE 100 [IU]/ML
15 INJECTION, SOLUTION SUBCUTANEOUS EVERY MORNING
Refills: 0 | Status: DISCONTINUED | OUTPATIENT
Start: 2024-03-29 | End: 2024-03-31

## 2024-03-29 RX ORDER — DEXTROAMPHETAMINE SACCHARATE, AMPHETAMINE ASPARTATE, DEXTROAMPHETAMINE SULFATE AND AMPHETAMINE SULFATE 1.875; 1.875; 1.875; 1.875 MG/1; MG/1; MG/1; MG/1
20 TABLET ORAL
Refills: 0 | Status: DISCONTINUED | OUTPATIENT
Start: 2024-03-29 | End: 2024-03-31

## 2024-03-29 RX ORDER — INSULIN LISPRO 100/ML
5 VIAL (ML) SUBCUTANEOUS
Refills: 0 | Status: DISCONTINUED | OUTPATIENT
Start: 2024-03-29 | End: 2024-03-31

## 2024-03-29 RX ORDER — POTASSIUM CHLORIDE 20 MEQ
10 PACKET (EA) ORAL
Refills: 0 | Status: DISCONTINUED | OUTPATIENT
Start: 2024-03-29 | End: 2024-03-29

## 2024-03-29 RX ORDER — POTASSIUM CHLORIDE 20 MEQ
10 PACKET (EA) ORAL
Refills: 0 | Status: COMPLETED | OUTPATIENT
Start: 2024-03-29 | End: 2024-03-30

## 2024-03-29 RX ORDER — ACETAMINOPHEN 500 MG
1000 TABLET ORAL ONCE
Refills: 0 | Status: COMPLETED | OUTPATIENT
Start: 2024-03-29 | End: 2024-03-29

## 2024-03-29 RX ADMIN — Medication 100 MILLIGRAM(S): at 20:59

## 2024-03-29 RX ADMIN — PANTOPRAZOLE SODIUM 40 MILLIGRAM(S): 20 TABLET, DELAYED RELEASE ORAL at 18:48

## 2024-03-29 RX ADMIN — Medication 100 MILLIEQUIVALENT(S): at 23:17

## 2024-03-29 RX ADMIN — Medication 400 MILLIGRAM(S): at 05:52

## 2024-03-29 RX ADMIN — GABAPENTIN 400 MILLIGRAM(S): 400 CAPSULE ORAL at 10:52

## 2024-03-29 RX ADMIN — Medication 5 UNIT(S): at 16:56

## 2024-03-29 RX ADMIN — MORPHINE SULFATE 15 MILLIGRAM(S): 50 CAPSULE, EXTENDED RELEASE ORAL at 18:49

## 2024-03-29 RX ADMIN — GABAPENTIN 400 MILLIGRAM(S): 400 CAPSULE ORAL at 22:43

## 2024-03-29 RX ADMIN — DEXTROAMPHETAMINE SACCHARATE, AMPHETAMINE ASPARTATE, DEXTROAMPHETAMINE SULFATE AND AMPHETAMINE SULFATE 20 MILLIGRAM(S): 1.875; 1.875; 1.875; 1.875 TABLET ORAL at 20:58

## 2024-03-29 RX ADMIN — Medication 40 MILLIEQUIVALENT(S): at 18:47

## 2024-03-29 RX ADMIN — Medication 3: at 12:07

## 2024-03-29 RX ADMIN — PANTOPRAZOLE SODIUM 40 MILLIGRAM(S): 20 TABLET, DELAYED RELEASE ORAL at 05:56

## 2024-03-29 RX ADMIN — OXCARBAZEPINE 600 MILLIGRAM(S): 300 TABLET, FILM COATED ORAL at 18:48

## 2024-03-29 RX ADMIN — INSULIN GLARGINE 15 UNIT(S): 100 INJECTION, SOLUTION SUBCUTANEOUS at 12:53

## 2024-03-29 RX ADMIN — Medication 2: at 16:55

## 2024-03-29 RX ADMIN — MORPHINE SULFATE 15 MILLIGRAM(S): 50 CAPSULE, EXTENDED RELEASE ORAL at 07:21

## 2024-03-29 RX ADMIN — LORATADINE 10 MILLIGRAM(S): 10 TABLET ORAL at 12:52

## 2024-03-29 RX ADMIN — Medication 100 MILLIGRAM(S): at 07:22

## 2024-03-29 RX ADMIN — Medication 3: at 08:45

## 2024-03-29 RX ADMIN — Medication 40 MILLIEQUIVALENT(S): at 10:09

## 2024-03-29 RX ADMIN — Medication 20 MILLIGRAM(S): at 05:57

## 2024-03-29 RX ADMIN — ARIPIPRAZOLE 2 MILLIGRAM(S): 15 TABLET ORAL at 22:43

## 2024-03-29 RX ADMIN — DEXTROAMPHETAMINE SACCHARATE, AMPHETAMINE ASPARTATE, DEXTROAMPHETAMINE SULFATE AND AMPHETAMINE SULFATE 20 MILLIGRAM(S): 1.875; 1.875; 1.875; 1.875 TABLET ORAL at 07:21

## 2024-03-29 RX ADMIN — MONTELUKAST 10 MILLIGRAM(S): 4 TABLET, CHEWABLE ORAL at 22:43

## 2024-03-29 RX ADMIN — OXCARBAZEPINE 600 MILLIGRAM(S): 300 TABLET, FILM COATED ORAL at 05:57

## 2024-03-29 RX ADMIN — Medication 5 UNIT(S): at 16:58

## 2024-03-29 RX ADMIN — Medication 40 MILLIGRAM(S): at 22:43

## 2024-03-29 RX ADMIN — Medication 20 MILLIGRAM(S): at 22:43

## 2024-03-29 RX ADMIN — Medication 25 MILLIGRAM(S): at 05:56

## 2024-03-29 SDOH — SOCIAL STABILITY - SOCIAL INSECURITY: DISSAPEARANCE AND DEATH OF FAMILY MEMBER: Z63.4
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
## 2024-03-29 NOTE — DISCHARGE NOTE PROVIDER - NSDCCPCAREPLAN_GEN_ALL_CORE_FT
PRINCIPAL DISCHARGE DIAGNOSIS  Diagnosis: Cellulitis  Assessment and Plan of Treatment: Continue Doxycycline until finished.      SECONDARY DISCHARGE DIAGNOSES  Diagnosis: Bipolar disorder  Assessment and Plan of Treatment: Follow up with outpatient MD.    Diagnosis: Multiple sclerosis  Assessment and Plan of Treatment: Follow up with outpatient rheumatologist    Diagnosis: Type 2 diabetes mellitus with hyperglycemia, without long-term current use of insulin  Assessment and Plan of Treatment: Continue Home Lantus medication    Diagnosis: Open wound of finger of right hand due to cat bite  Assessment and Plan of Treatment: Follow up with Visiting Nurse Service for wound care.

## 2024-03-29 NOTE — DISCHARGE NOTE PROVIDER - PROVIDER TOKENS
PROVIDER:[TOKEN:[[id number]],FOLLOWUP:[1 week]],PROVIDER:[TOKEN:[[id number]],FOLLOWUP:[1 week]]

## 2024-03-29 NOTE — BH CONSULTATION LIAISON ASSESSMENT NOTE - NSBHCHARTREVIEWVS_PSY_A_CORE FT
Vital Signs Last 24 Hrs  T(C): 37.1 (29 Mar 2024 09:34), Max: 37.3 (29 Mar 2024 01:30)  T(F): 98.8 (29 Mar 2024 09:34), Max: 99.1 (29 Mar 2024 01:30)  HR: 90 (29 Mar 2024 09:34) (79 - 98)  BP: 139/78 (29 Mar 2024 09:34) (112/74 - 139/80)  BP(mean): --  RR: 18 (29 Mar 2024 09:34) (12 - 23)  SpO2: 94% (29 Mar 2024 09:34) (93% - 99%)    Parameters below as of 29 Mar 2024 09:34  Patient On (Oxygen Delivery Method): room air

## 2024-03-29 NOTE — BH CONSULTATION LIAISON ASSESSMENT NOTE - NSBHCONSULTRECOMMENDOTHER_PSY_A_CORE FT
- Patient's diagnosis and medication management needs a psychiatrist to determine and treat accordingly. Not going to be taken care of on the day of discharge from a medical unit   - Patient's diagnosis and medication management needs a psychiatrist to determine and treat accordingly. Not going to be taken care of on the day of discharge from a medical unit  - Patient is satisfied with her current psychiatric medication regimen that is managed by her PCP whoim she has known since age 11 with low motivation to seek out an actual psychiatrist  - Patient is open to therapy as she is missing her mother and has some bereavement

## 2024-03-29 NOTE — PROGRESS NOTE ADULT - NS ATTEND AMEND GEN_ALL_CORE FT
I have reviewed all pertinent clinical information and agree with the NP's note.  Labs, new imaging (if applicable) and vitals reviewed.  Agree with the above assessment and plan.    cat bite s/p debridement   follow cultures   Doxycycline 100mg 2 times a day for 3 weeks assuming pasteurella   follow  recommendations     Discussed with Dr. Aden Zuniga, DO  Chief, Infectious Disease at [facility]  Reachable via Microsoft Teams or ID office: [phone]  Weekdays: After 5pm, please call [phone] for all inquiries and new consults  Weekends: Message on-call infectious disease physician via teams (see Darion)

## 2024-03-29 NOTE — BH CONSULTATION LIAISON ASSESSMENT NOTE - CURRENT MEDICATION
MEDICATIONS  (STANDING):  amphetamine/dextroamphetamine 20 milliGRAM(s) Oral two times a day  ARIPiprazole 2 milliGRAM(s) Oral at bedtime  dextrose 5%. 1000 milliLiter(s) (50 mL/Hr) IV Continuous <Continuous>  dextrose 5%. 1000 milliLiter(s) (100 mL/Hr) IV Continuous <Continuous>  dextrose 50% Injectable 25 Gram(s) IV Push once  dextrose 50% Injectable 12.5 Gram(s) IV Push once  dextrose 50% Injectable 25 Gram(s) IV Push once  diphtheria/tetanus/pertussis (acellular) Vaccine (Adacel) 0.5 milliLiter(s) IntraMuscular once  doxycycline monohydrate Capsule 100 milliGRAM(s) Oral two times a day  furosemide    Tablet 20 milliGRAM(s) Oral daily  gabapentin 400 milliGRAM(s) Oral two times a day  glucagon  Injectable 1 milliGRAM(s) IntraMuscular once  glucagon  Injectable 1 milliGRAM(s) IntraMuscular once  insulin glargine Injectable (LANTUS) 15 Unit(s) SubCutaneous every morning  insulin lispro (ADMELOG) corrective regimen sliding scale   SubCutaneous at bedtime  insulin lispro (ADMELOG) corrective regimen sliding scale   SubCutaneous three times a day before meals  insulin lispro Injectable (ADMELOG) 5 Unit(s) SubCutaneous three times a day before meals  loratadine 10 milliGRAM(s) Oral daily  metoprolol succinate ER 25 milliGRAM(s) Oral daily  montelukast 10 milliGRAM(s) Oral at bedtime  morphine ER Tablet 15 milliGRAM(s) Oral two times a day  OXcarbazepine 600 milliGRAM(s) Oral two times a day  pantoprazole    Tablet 40 milliGRAM(s) Oral two times a day  PARoxetine 40 milliGRAM(s) Oral at bedtime  potassium chloride    Tablet ER 40 milliEquivalent(s) Oral every 4 hours  predniSONE   Tablet 20 milliGRAM(s) Oral at bedtime    MEDICATIONS  (PRN):  acetaminophen     Tablet .. 650 milliGRAM(s) Oral every 6 hours PRN Temp greater or equal to 38C (100.4F), Mild Pain (1 - 3)  albuterol    0.083%. 2.5 milliGRAM(s) Nebulizer once PRN Bronchospasm  benzocaine/menthol Lozenge 1 Lozenge Oral two times a day PRN Sore Throat  cyclobenzaprine 10 milliGRAM(s) Oral daily PRN Muscle Spasm  dextrose Oral Gel 15 Gram(s) Oral once PRN Blood Glucose LESS THAN 70 milliGRAM(s)/deciliter  melatonin 3 milliGRAM(s) Oral at bedtime PRN Insomnia  SUMAtriptan 100 milliGRAM(s) Oral daily PRN migraines  traZODone 50 milliGRAM(s) Oral at bedtime PRN for insomnia

## 2024-03-29 NOTE — PROGRESS NOTE ADULT - ASSESSMENT
Nelda Ley is a 48 year old female with PMHx of bipolar disorder, multiple sclerosis, lupus, NIDDM2, chronic migraines, ADHD, and fibromyalgia who presented to the ED on 3/25/24 for complaints of cat bite and admitted for R. 4th finger cellulitis and open wounds secondary to cat bite, need to r/o tendon injury.     R. 4th finger cellulitis and open wounds secondary to cat bite.  Sustained cat bite on 3/17/24, took 7-day course of levofloxacin 750 mg, initially with improvement of erythema and swelling  New serosanguineous drainage and inability to flex or extend R. 4th finger x 3 days  R. hand CT showed acute tenosynovitis right 4th finger tendon.   S/p 1L NS bolus, ceftriaxone 1 g IV, and metronidazole 500 mg IV in the ED  Tetanus vaccine ordered  I and D today for flexor tenosynovitis 4 th right ring finger. Follow post procedure recs.   cont doxycycline per ID.   Cont local wound care.       Hypokalemia: worse.   Replete aggressively with iv KCl. Follow BMP in am     Chronic medical conditions:  Depression: PTA paroxetine 40 mg  Bipolar disorder: PTA oxcarbazepine 600 mg BID, aripiprazole 2 mg qhs. patient's sister raised concern about her meds. Consulted Dr. Velasquez>>FU meds.   Multiple sclerosis: PTA prednisone 20 mg  NIDDM2 with hyperglycemia: Adjusted lantus for hyperglycemia. Follow finger sticks   Chronic migraines: PTA sumatriptan 100 mg PRN  ADHD: PTA Adderall 20 mg BID  Fibromyalgia: cont current management.     DVT ppx: Low risk  FC  Dispo: Medically no ready pending Dr. Velasquez consult recs, tissue culture results and worsening hypokalemia management.

## 2024-03-29 NOTE — PROGRESS NOTE ADULT - ASSESSMENT
48F who sustained a cat bite  has  multiple allergies   unclear which is real and which was though t to be from one med but was actually from another   ideally want unasyn for pastaurella coverage but due to penicillin allergy and report of angioedema ok to use Doxycycline 100mg 2 times a day   will follow cultures to help target our antibiotics therapy     3/29: s/p I&d of right hand yesterday, pain is controlled, no fevers, RA, no new cbc, no wbc yesterday, Cr ok, surgical culture is pending, hiv pending, abx continued, Doxycycline.     Plan:  continue Doxycycline 100mg 2 times a day   Doxycycline to be taken sitting up without lying down for 30 min, ideal to take with food   Follow OR cultures - pending   good but not too tight glycemic control  send HIV - pending   surgical dressing change per surgery     Discussed with Dr. Zuniga  48F who sustained a cat bite  has  multiple allergies   unclear which is real and which was though t to be from one med but was actually from another   ideally want Unasyn for pasteurella coverage but due to penicillin allergy and report of angioedema ok to use Doxycycline 100mg 2 times a day   will follow cultures to help target our antibiotics therapy     3/29: s/p I&d of right hand yesterday, pain is controlled, no fevers, RA, no new cbc, no wbc yesterday, Cr ok, surgical culture is pending, hiv pending, abx continued, Doxycycline.     Plan:  continue Doxycycline 100mg 2 times a day   Doxycycline to be taken sitting up without lying down for 30 min, ideal to take with food, avoid prolonged sun exposure    Follow OR cultures - pending   good but not too tight glycemic control  send HIV - pending   surgical dressing change per surgery     Discussed with Dr. Zuniga

## 2024-03-29 NOTE — BH CONSULTATION LIAISON ASSESSMENT NOTE - SUMMARY
Lifelong functional history, chronology of events/symptoms, clinical presentation most consistent with a Personality Disorder, possible "just on the line" Spectrum traits who has been otherwise functioning at baseline which is not distressing to Patient and more unsettling to family as now they are responsible for her care after mother's death.

## 2024-03-29 NOTE — DISCHARGE NOTE PROVIDER - CARE PROVIDERS DIRECT ADDRESSES
[email or web address]

## 2024-03-29 NOTE — DISCHARGE NOTE PROVIDER - HOSPITAL COURSE
Nelda Ley is a 48 year old female with PMHx of bipolar disorder, multiple sclerosis, lupus, NIDDM2, chronic migraines, ADHD, and fibromyalgia who presented to the ED on 3/25/24 for complaints of cat bite and admitted for R. 4th finger cellulitis and open wounds secondary to cat bite, need to r/o tendon injury.     R. 4th finger cellulitis and open wounds secondary to cat bite.  Sustained cat bite on 3/17/24, took 7-day course of levofloxacin 750 mg, initially with improvement of erythema and swelling  New serosanguineous drainage and inability to flex or extend R. 4th finger x 3 days  R. hand CT showed acute tenosynovitis right 4th finger tendon.   S/p 1L NS bolus, ceftriaxone 1 g IV, and metronidazole 500 mg IV in the ED  Tetanus vaccine ordered  I and D today for flexor tenosynovitis 4 th right ring finger. Follow post procedure recs.   cont doxycycline per ID.   Cont local wound care. If plastic surgery is ok with patient's discharge, patient can be discharged today on oral antibiotic with instructions to have outptient follow up with ID and Plastic surgery.       Hypokalemia: repleted.     Chronic medical conditions:  Depression: PTA paroxetine 40 mg  Bipolar disorder: PTA oxcarbazepine 600 mg BID, aripiprazole 2 mg qhs. patient's sister raised concern about her meds. Consulted Dr. Velasquez>>Recs reviewed.   Multiple sclerosis: PTA prednisone 20 mg  NIDDM2 with hyperglycemia: labile blood sugars. cont current management.   Chronic migraines: PTA sumatriptan 100 mg PRN  ADHD: PTA Adderall 20 mg BID  Fibromyalgia: cont current management.      Nelda Ley is a 48 year old female with PMHx of bipolar disorder, multiple sclerosis, lupus, NIDDM2, chronic migraines, ADHD, and fibromyalgia who presented to the ED on 3/25/24 for complaints of cat bite and admitted for R. 4th finger cellulitis and open wounds secondary to cat bite, need to r/o tendon injury.     R. 4th finger cellulitis and open wounds secondary to cat bite.  Sustained cat bite on 3/17/24, took 7-day course of levofloxacin 750 mg, initially with improvement of erythema and swelling  New serosanguineous drainage and inability to flex or extend R. 4th finger x 3 days  R. hand CT showed acute tenosynovitis right 4th finger tendon.   S/p 1L NS bolus, ceftriaxone 1 g IV, and metronidazole 500 mg IV in the ED  Tetanus vaccine ordered  I and D today for flexor tenosynovitis 4 th right ring finger. Follow post procedure recs.   cont doxycycline per ID.   Cont local wound care. Plastic surgery cleared patient for discharge today.       Hypokalemia: repleted.     Chronic medical conditions:  Depression: PTA paroxetine 40 mg  Bipolar disorder: PTA oxcarbazepine 600 mg BID, aripiprazole 2 mg qhs. patient's sister raised concern about her meds. Consulted Dr. Velaqsuez>>Recs reviewed.   Multiple sclerosis: PTA prednisone 20 mg  NIDDM2 with hyperglycemia: labile blood sugars. cont current management.   Chronic migraines: PTA sumatriptan 100 mg PRN  ADHD: PTA Adderall 20 mg BID  Fibromyalgia: cont current management.

## 2024-03-29 NOTE — DISCHARGE NOTE PROVIDER - NSDCMRMEDTOKEN_GEN_ALL_CORE_FT
Adderall 20 mg oral tablet: 1 tab(s) orally 2 times a day  Aimovig 70 mg/mL subcutaneous solution: 70 milligram(s) subcutaneously once a month for migraines  ARIPiprazole 2 mg oral tablet: 1 tab(s) orally once a day (at bedtime)  cyclobenzaprine 10 mg oral tablet: 1 tab(s) orally once a day as needed for  muscle spasm  furosemide 20 mg oral tablet: 1 tab(s) orally once a day  gabapentin 400 mg oral tablet: 1 tab(s) orally 2 times a day  hydrocodone-acetaminophen 7.5 mg-325 mg oral tablet: 1 tab(s) orally 2 times a day  insulin glargine-lixisenatide 100 units-33 mcg/mL subcutaneous solution: subcutaneous 2 times a day  levocetirizine 5 mg oral tablet: 1 tab(s) orally once a day  metoprolol succinate 25 mg oral tablet, extended release: 1 tab(s) orally once a day  montelukast 10 mg oral tablet: 1 tab(s) orally once a day (at bedtime)  morphine 15 mg/12 hr oral tablet, extended release: 1 tab(s) orally 2 times a day  omeprazole 40 mg oral delayed release capsule: 1 cap(s) orally once a day  ondansetron 8 mg oral tablet: 1 tab(s) orally 2 times a day as needed for  nausea  OXcarbazepine 600 mg oral tablet: 1 tab(s) orally 2 times a day  PARoxetine 40 mg oral tablet: 1 tab(s) orally once a day (at bedtime)  predniSONE 20 mg oral tablet: 1 tab(s) orally once a day (at bedtime)  SUMAtriptan 100 mg oral tablet: 1 tab(s) orally once a day as needed for migraines  traZODone 50 mg oral tablet: 1 tab(s) orally once a day (at bedtime) as needed for  insomnia   Adderall 20 mg oral tablet: 1 tab(s) orally 2 times a day  Aimovig 70 mg/mL subcutaneous solution: 70 milligram(s) subcutaneously once a month for migraines  ARIPiprazole 2 mg oral tablet: 1 tab(s) orally once a day (at bedtime)  cyclobenzaprine 10 mg oral tablet: 1 tab(s) orally once a day as needed for  muscle spasm  doxycycline hyclate 100 mg oral capsule: 1 cap(s) orally 2 times a day  furosemide 20 mg oral tablet: 1 tab(s) orally once a day  gabapentin 400 mg oral tablet: 1 tab(s) orally 2 times a day  insulin glargine-lixisenatide 100 units-33 mcg/mL subcutaneous solution: subcutaneous 2 times a day  levocetirizine 5 mg oral tablet: 1 tab(s) orally once a day  metoprolol succinate 25 mg oral tablet, extended release: 1 tab(s) orally once a day  montelukast 10 mg oral tablet: 1 tab(s) orally once a day (at bedtime)  morphine 15 mg/12 hr oral tablet, extended release: 1 tab(s) orally 2 times a day  omeprazole 40 mg oral delayed release capsule: 1 cap(s) orally once a day  ondansetron 8 mg oral tablet: 1 tab(s) orally 2 times a day as needed for  nausea  OXcarbazepine 600 mg oral tablet: 1 tab(s) orally 2 times a day  PARoxetine 40 mg oral tablet: 1 tab(s) orally once a day (at bedtime)  predniSONE 20 mg oral tablet: 1 tab(s) orally once a day (at bedtime)  SUMAtriptan 100 mg oral tablet: 1 tab(s) orally once a day as needed for migraines  traZODone 50 mg oral tablet: 1 tab(s) orally once a day (at bedtime) as needed for  insomnia

## 2024-03-29 NOTE — BH CONSULTATION LIAISON ASSESSMENT NOTE - NSBHCONSULTFOLLOWAFTERCARE_PSY_A_CORE FT
recommending [facility] health crisis walk-in center (get linked with psychiatric services)  [address] [place] [address], [place] [id number]  Phone [phone]   ** Online appointment at [email or web address]   recommending [facility] crisis walk-in center (get linked with psychiatric services)  [address] [place], [place] [address]  Phone [phone]   ** Online appointment at [email or web address]  - [email or web address] for therapist

## 2024-03-29 NOTE — DISCHARGE NOTE PROVIDER - CARE PROVIDER_API CALL
Malachi Parra  Plastic Surgery  [address] [place] [address], [address]  [place] [address]  Phone: [phone]  Fax: [phone]  Follow Up Time: 1 week    Jerrell Zuniga  Infectious Disease  [address] [place] [address]  Phone: [phone]  Fax: [phone]  Follow Up Time: 1 week

## 2024-03-30 ENCOUNTER — TRANSCRIPTION ENCOUNTER (OUTPATIENT)
Age: 49
End: 2024-03-30

## 2024-03-30 LAB
ANION GAP SERPL CALC-SCNC: 3 MMOL/L — LOW (ref 5–17)
ANION GAP SERPL CALC-SCNC: 9 MMOL/L — SIGNIFICANT CHANGE UP (ref 5–17)
BUN SERPL-MCNC: 7 MG/DL — SIGNIFICANT CHANGE UP (ref 7–23)
BUN SERPL-MCNC: 9 MG/DL — SIGNIFICANT CHANGE UP (ref 7–23)
CALCIUM SERPL-MCNC: 8.7 MG/DL — SIGNIFICANT CHANGE UP (ref 8.5–10.1)
CALCIUM SERPL-MCNC: 8.9 MG/DL — SIGNIFICANT CHANGE UP (ref 8.5–10.1)
CHLORIDE SERPL-SCNC: 104 MMOL/L — SIGNIFICANT CHANGE UP (ref 96–108)
CHLORIDE SERPL-SCNC: 107 MMOL/L — SIGNIFICANT CHANGE UP (ref 96–108)
CO2 SERPL-SCNC: 30 MMOL/L — SIGNIFICANT CHANGE UP (ref 22–31)
CO2 SERPL-SCNC: 32 MMOL/L — HIGH (ref 22–31)
CREAT SERPL-MCNC: 0.52 MG/DL — SIGNIFICANT CHANGE UP (ref 0.5–1.3)
CREAT SERPL-MCNC: 0.75 MG/DL — SIGNIFICANT CHANGE UP (ref 0.5–1.3)
CULTURE RESULTS: SIGNIFICANT CHANGE UP
CULTURE RESULTS: SIGNIFICANT CHANGE UP
EGFR: 115 ML/MIN/1.73M2 — SIGNIFICANT CHANGE UP
EGFR: 99 ML/MIN/1.73M2 — SIGNIFICANT CHANGE UP
GLUCOSE BLDC GLUCOMTR-MCNC: 125 MG/DL — HIGH (ref 70–99)
GLUCOSE BLDC GLUCOMTR-MCNC: 134 MG/DL — HIGH (ref 70–99)
GLUCOSE BLDC GLUCOMTR-MCNC: 198 MG/DL — HIGH (ref 70–99)
GLUCOSE BLDC GLUCOMTR-MCNC: 314 MG/DL — HIGH (ref 70–99)
GLUCOSE SERPL-MCNC: 139 MG/DL — HIGH (ref 70–99)
GLUCOSE SERPL-MCNC: 181 MG/DL — HIGH (ref 70–99)
MAGNESIUM SERPL-MCNC: 1.6 MG/DL — SIGNIFICANT CHANGE UP (ref 1.6–2.6)
PHOSPHATE SERPL-MCNC: 2.2 MG/DL — LOW (ref 2.5–4.5)
POTASSIUM SERPL-MCNC: 2.8 MMOL/L — CRITICAL LOW (ref 3.5–5.3)
POTASSIUM SERPL-MCNC: 3 MMOL/L — LOW (ref 3.5–5.3)
POTASSIUM SERPL-SCNC: 2.8 MMOL/L — CRITICAL LOW (ref 3.5–5.3)
POTASSIUM SERPL-SCNC: 3 MMOL/L — LOW (ref 3.5–5.3)
SODIUM SERPL-SCNC: 142 MMOL/L — SIGNIFICANT CHANGE UP (ref 135–145)
SODIUM SERPL-SCNC: 143 MMOL/L — SIGNIFICANT CHANGE UP (ref 135–145)
SPECIMEN SOURCE: SIGNIFICANT CHANGE UP
SPECIMEN SOURCE: SIGNIFICANT CHANGE UP

## 2024-03-30 PROCEDURE — 99232 SBSQ HOSP IP/OBS MODERATE 35: CPT

## 2024-03-30 RX ORDER — SIMETHICONE 80 MG/1
80 TABLET, CHEWABLE ORAL EVERY 6 HOURS
Refills: 0 | Status: DISCONTINUED | OUTPATIENT
Start: 2024-03-30 | End: 2024-03-31

## 2024-03-30 RX ORDER — POTASSIUM CHLORIDE 20 MEQ
40 PACKET (EA) ORAL EVERY 4 HOURS
Refills: 0 | Status: COMPLETED | OUTPATIENT
Start: 2024-03-30 | End: 2024-03-30

## 2024-03-30 RX ORDER — POTASSIUM CHLORIDE 20 MEQ
10 PACKET (EA) ORAL
Refills: 0 | Status: COMPLETED | OUTPATIENT
Start: 2024-03-30 | End: 2024-03-30

## 2024-03-30 RX ADMIN — GABAPENTIN 400 MILLIGRAM(S): 400 CAPSULE ORAL at 09:32

## 2024-03-30 RX ADMIN — MORPHINE SULFATE 15 MILLIGRAM(S): 50 CAPSULE, EXTENDED RELEASE ORAL at 17:28

## 2024-03-30 RX ADMIN — LORATADINE 10 MILLIGRAM(S): 10 TABLET ORAL at 11:51

## 2024-03-30 RX ADMIN — OXCARBAZEPINE 600 MILLIGRAM(S): 300 TABLET, FILM COATED ORAL at 17:29

## 2024-03-30 RX ADMIN — MORPHINE SULFATE 15 MILLIGRAM(S): 50 CAPSULE, EXTENDED RELEASE ORAL at 06:35

## 2024-03-30 RX ADMIN — PANTOPRAZOLE SODIUM 40 MILLIGRAM(S): 20 TABLET, DELAYED RELEASE ORAL at 17:28

## 2024-03-30 RX ADMIN — Medication 25 MILLIGRAM(S): at 06:36

## 2024-03-30 RX ADMIN — DEXTROAMPHETAMINE SACCHARATE, AMPHETAMINE ASPARTATE, DEXTROAMPHETAMINE SULFATE AND AMPHETAMINE SULFATE 20 MILLIGRAM(S): 1.875; 1.875; 1.875; 1.875 TABLET ORAL at 18:11

## 2024-03-30 RX ADMIN — Medication 20 MILLIGRAM(S): at 22:07

## 2024-03-30 RX ADMIN — Medication 40 MILLIEQUIVALENT(S): at 14:48

## 2024-03-30 RX ADMIN — Medication 1: at 11:50

## 2024-03-30 RX ADMIN — SIMETHICONE 80 MILLIGRAM(S): 80 TABLET, CHEWABLE ORAL at 09:31

## 2024-03-30 RX ADMIN — Medication 100 MILLIEQUIVALENT(S): at 16:36

## 2024-03-30 RX ADMIN — GABAPENTIN 400 MILLIGRAM(S): 400 CAPSULE ORAL at 22:07

## 2024-03-30 RX ADMIN — Medication 5 UNIT(S): at 11:50

## 2024-03-30 RX ADMIN — PANTOPRAZOLE SODIUM 40 MILLIGRAM(S): 20 TABLET, DELAYED RELEASE ORAL at 06:34

## 2024-03-30 RX ADMIN — MONTELUKAST 10 MILLIGRAM(S): 4 TABLET, CHEWABLE ORAL at 22:07

## 2024-03-30 RX ADMIN — Medication 40 MILLIEQUIVALENT(S): at 22:07

## 2024-03-30 RX ADMIN — INSULIN GLARGINE 15 UNIT(S): 100 INJECTION, SOLUTION SUBCUTANEOUS at 09:29

## 2024-03-30 RX ADMIN — Medication 20 MILLIGRAM(S): at 06:34

## 2024-03-30 RX ADMIN — DEXTROAMPHETAMINE SACCHARATE, AMPHETAMINE ASPARTATE, DEXTROAMPHETAMINE SULFATE AND AMPHETAMINE SULFATE 20 MILLIGRAM(S): 1.875; 1.875; 1.875; 1.875 TABLET ORAL at 06:48

## 2024-03-30 RX ADMIN — Medication 100 MILLIGRAM(S): at 06:37

## 2024-03-30 RX ADMIN — Medication 4: at 09:19

## 2024-03-30 RX ADMIN — Medication 100 MILLIEQUIVALENT(S): at 14:48

## 2024-03-30 RX ADMIN — OXCARBAZEPINE 600 MILLIGRAM(S): 300 TABLET, FILM COATED ORAL at 06:36

## 2024-03-30 RX ADMIN — Medication 100 MILLIGRAM(S): at 17:28

## 2024-03-30 RX ADMIN — MORPHINE SULFATE 15 MILLIGRAM(S): 50 CAPSULE, EXTENDED RELEASE ORAL at 18:45

## 2024-03-30 RX ADMIN — Medication 5 UNIT(S): at 16:36

## 2024-03-30 RX ADMIN — Medication 100 MILLIEQUIVALENT(S): at 00:48

## 2024-03-30 RX ADMIN — ARIPIPRAZOLE 2 MILLIGRAM(S): 15 TABLET ORAL at 22:08

## 2024-03-30 RX ADMIN — Medication 40 MILLIGRAM(S): at 22:07

## 2024-03-30 RX ADMIN — Medication 5 UNIT(S): at 09:19

## 2024-03-30 RX ADMIN — Medication 100 MILLIEQUIVALENT(S): at 02:37

## 2024-03-30 NOTE — PROGRESS NOTE ADULT - PROVIDER SPECIALTY LIST ADULT
Hospitalist
Plastic Surgery
Hospitalist
Hospitalist
Internal Medicine
Hospitalist
Plastic Surgery
Infectious Disease
Plastic Surgery

## 2024-03-30 NOTE — DISCHARGE NOTE NURSING/CASE MANAGEMENT/SOCIAL WORK - PATIENT PORTAL LINK FT
You can access the FollowMyHealth Patient Portal offered by [facility] by registering at the following website: [email or web address]. By joining [facility]’s FollowMyHealth portal, you will also be able to view your health information using other applications (apps) compatible with our system.

## 2024-03-30 NOTE — PROGRESS NOTE ADULT - ASSESSMENT
Nelda Ley is a 48 year old female with PMHx of bipolar disorder, multiple sclerosis, lupus, NIDDM2, chronic migraines, ADHD, and fibromyalgia who presented to the ED on 3/25/24 for complaints of cat bite and admitted for R. 4th finger cellulitis and open wounds secondary to cat bite, need to r/o tendon injury.     R. 4th finger cellulitis and open wounds secondary to cat bite.  Sustained cat bite on 3/17/24, took 7-day course of levofloxacin 750 mg, initially with improvement of erythema and swelling  New serosanguineous drainage and inability to flex or extend R. 4th finger x 3 days  R. hand CT showed acute tenosynovitis right 4th finger tendon.   S/p 1L NS bolus, ceftriaxone 1 g IV, and metronidazole 500 mg IV in the ED  Tetanus vaccine ordered  I and D today for flexor tenosynovitis 4 th right ring finger. Follow post procedure recs.   cont doxycycline per ID.   Cont local wound care. Discussed with plastic surgery>>planning for dressing change today. Follow recs. Patient wants to go home.       Hypokalemia: repleted.     Chronic medical conditions:  Depression: PTA paroxetine 40 mg  Bipolar disorder: PTA oxcarbazepine 600 mg BID, aripiprazole 2 mg qhs. patient's sister raised concern about her meds. Consulted Dr. Velasquez>>Recs reviewed.   Multiple sclerosis: PTA prednisone 20 mg  NIDDM2 with hyperglycemia: labile blood sugars. cont current management.   Chronic migraines: PTA sumatriptan 100 mg PRN  ADHD: PTA Adderall 20 mg BID  Fibromyalgia: cont current management.     DVT ppx: Low risk  FC  Dispo: Home on oral antibiotic once cleared by plastic surgery.

## 2024-03-30 NOTE — PROGRESS NOTE ADULT - SUBJECTIVE AND OBJECTIVE BOX
48y Female admitted with RIGHT FINGER INFECTION    CELLULITIS    .    Patient seen and examined bedside resting comfortably.  No complaints offered.   Denies loss of sensation.  pain not worse than yesterday    T(F): 97.7 (03-27-24 @ 17:49), Max: 98.6 (03-27-24 @ 11:03)  HR: 80 (03-27-24 @ 17:49) (80 - 89)  BP: 116/74 (03-27-24 @ 17:49) (116/74 - 133/79)  RR: 18 (03-27-24 @ 17:49) (18 - 18)  SpO2: 97% (03-27-24 @ 17:49) (96% - 97%)  Wt(kg): --  CAPILLARY BLOOD GLUCOSE      POCT Blood Glucose.: 201 mg/dL (27 Mar 2024 16:04)  POCT Blood Glucose.: 199 mg/dL (27 Mar 2024 11:10)  POCT Blood Glucose.: 320 mg/dL (27 Mar 2024 07:51)  POCT Blood Glucose.: 244 mg/dL (26 Mar 2024 21:36)      PHYSICAL EXAM:  General: NAD, WDWN.   Neuro:  Alert & oriented x 3  HEENT: NCAT, EOMI, conjunctiva clear  CV: +S1+S2 regular rate and rhythm  Lung: clear to ausculation bilaterally, respirations nonlabored, good inspiratory effort  Abdomen: soft, NTND. Normactive BS  Extremities: mild redness dorsal right RF prox phal vs yesterday, no spontaneous drainage, grossly intact sensation all digits right hand    LABS:                        11.7   8.51  )-----------( 239      ( 27 Mar 2024 08:00 )             35.9     03-27    137  |  101  |  13  ----------------------------<  305<H>  3.3<L>   |  29  |  0.69    Ca    9.3      27 Mar 2024 08:00  Mg     1.5     03-26        I&O's Detail  ACC: [id number] EXAM:  CT HAND ONLY RT   ORDERED BY: JUAN MIGUEL HAAS     PROCEDURE DATE:  03/27/2024          INTERPRETATION:  CT HAND RIGHT dated 3/27/2024 1:50 PM    INDICATION: Pain and swelling at the fourth digit after bite    COMPARISON: None available.    TECHNIQUE: CT imaging of the right hand and wrist was performed. The data   was reformatted in the axial, coronal, and sagittal planes. Additionally,   3-D reformatted imaging was created.    FINDINGS:    OSSEOUS STRUCTURES: No cortical erosion or destruction is appreciated.   There is no periosteal reaction  SYNOVIUM/ JOINT FLUID: No joint effusion is noted.  TENDONS: There is a heterogeneous appearance of the flexor tendon to the   ring finger with surrounding edema. Suspect moderate tenosynovitis. This   is most prominent at the proximal and middle phalanges.  MUSCLES: Preserved musculature  NEUROVASCULAR STRUCTURES: Preserved  SUBCUTANEOUS SOFT TISSUES: Mild soft tissue edema within the index   finger. Focal soft tissue edema noted in the lateral margin of the mid   ring finger proximal phalanx.    3-D reformatted imaging confirms these findings.    IMPRESSION:    1.  Soft tissue swelling within the ring finger.  2.  Suspect moderate ring finger flexor tenosynovitis prominentat the   middle and proximal phalanges.  3.  No CT evidence for osteomyelitis.    --- End of Report ---      
Patient seen and  examined at bedside resting comfortably.   Admits to pain at Right hand but offers no other complaints. States her sensation is intact, denies any numbness at right 4th digit.   Admits to being able to wiggle RUE digits while hand in bandage.   Denies fever, chills, N/V/D, CP, SOB.    Vital Signs Last 24 Hrs  T(F): 98.7 (03-29-24 @ 16:46), Max: 99.1 (03-29-24 @ 01:30)  HR: 84 (03-29-24 @ 16:46)  BP: 147/89 (03-29-24 @ 16:46)  RR: 18 (03-29-24 @ 16:46)  SpO2: 93% (03-29-24 @ 16:46)  POCT Blood Glucose.: 208 mg/dL (29 Mar 2024 16:11)    PHYSICAL EXAM:  GENERAL: Alert, NAD  CHEST/LUNG: Clear to auscultation bilaterally, respirations nonlabored  HEART: Regular rate and rhythm; S1 & S2 appreciated  ABDOMEN: + Bowel sounds, soft, Nontender, Nondistended  EXTREMITIES: RUE wrapped with ACE bandage, in sling. Right 4th left digit sensation intact, adequate motor function    I&O's Detail      LABS:                        11.8   7.56  )-----------( 228      ( 28 Mar 2024 04:28 )             36.6     03-29    141  |  102  |  11  ----------------------------<  297<H>  2.9<LL>   |  28  |  0.70    Ca    8.6      29 Mar 2024 07:10          RADIOLOGY & ADDITIONAL STUDIES:    A/P  48F  a/w right 4th digit infection 2/2 cat bite now s/p I&D and washout of flexor tendon sheath.   - abx per ID  - f/u OR cultures  - dressing change tomorrow per Dr. Fernandez   - continue care per primary team   - d/w Dr. Fernandez 
    Post Op Day#: 2    Procedure:  Incision and drainage, wound, finger, right right    Exploration of tendon sheath of hand, right ring finger        Vital Signs Last 24 Hrs  T(C): 36.8 (30 Mar 2024 17:10), Max: 37.2 (30 Mar 2024 00:13)  T(F): 98.3 (30 Mar 2024 17:10), Max: 99 (30 Mar 2024 00:13)  HR: 79 (30 Mar 2024 17:10) (62 - 86)  BP: 118/76 (30 Mar 2024 17:10) (112/70 - 131/91)  BP(mean): --  RR: 18 (30 Mar 2024 17:10) (18 - 18)  SpO2: 93% (30 Mar 2024 17:10) (93% - 96%)    Parameters below as of 30 Mar 2024 17:10  Patient On (Oxygen Delivery Method): room air        I&O's Detail    30 Mar 2024 07:01  -  30 Mar 2024 19:21  --------------------------------------------------------  IN:    Oral Fluid: 357 mL  Total IN: 357 mL    OUT:  Total OUT: 0 mL    Total NET: 357 mL              03-30    142  |  107  |  9   ----------------------------<  139<H>  3.0<L>   |  32<H>  |  0.75    Ca    8.7      30 Mar 2024 17:45  Phos  2.2     03-30  Mg     1.6     03-30        INR:     Radiology:    Physical Exam:    General:  Appears stated age, well-groomed, well-nourished, no distress  Eyes : ZAY  HENT:  WNL, no JVD  Chest:  clear breath sounds  Cardiovascular:  Regular rate & rhythm  Abdomen:    Extremities:  Gait & station:    Skin:  No rash  Musculoskeletal:  OR dressing and splint right hand removed, serosanguinous drainage from wound, packing removed after irrigation,  edema prox phal improving, pt able to increase ROM RF somewhat, digit neurovascularly intact  Neuro/Psych:  Alert, oriented to time, place and person 
AIME ERWIN  MRN-[id number]    Follow Up:  cat bite     Interval History:  The pt was seen and examined earlier, not in acute distress, pt is awake and alert, appropriate, complains of pain in her hand, asking for pain medication, RN notified. Pt is afebrile, RA, no new cbc, no wbc yesterday.     PAST MEDICAL & SURGICAL HISTORY:  Lupus      Fibromyalgia      Multiple sclerosis      Diabetes mellitus type II, non insulin dependent      Migraines      ADHD      Bipolar disorder          ROS:    [ ] Unobtainable because:  [x ] All other systems negative    Constitutional: no fever, no chills  Head: no trauma  Eyes: no vision changes, no eye pain  ENT:  no sore throat, no rhinorrhea  Cardiovascular:  no chest pain, no palpitation  Respiratory:  no SOB, no cough  GI:  no abd pain, no vomiting, no diarrhea  urinary: no dysuria, no hematuria, no flank pain  musculoskeletal:  post op pain is controlled   skin:  no rash  neurology:  no headache, no seizure, no change in mental status  psych: no anxiety, no depression         Allergies  Cipro (Rash)  Prozac (Angioedema)  contrast media (iodine-based) (Angioedema)  penicillin (Angioedema)  Diflucan (Stomach Upset)  clindamycin (Rash)  Celebrex (Rash)        ANTIMICROBIALS:  doxycycline monohydrate Capsule 100 two times a day      OTHER MEDS:  acetaminophen     Tablet .. 650 milliGRAM(s) Oral every 6 hours PRN  albuterol    0.083%. 2.5 milliGRAM(s) Nebulizer once PRN  amphetamine/dextroamphetamine 20 milliGRAM(s) Oral two times a day  ARIPiprazole 2 milliGRAM(s) Oral at bedtime  benzocaine/menthol Lozenge 1 Lozenge Oral two times a day PRN  cyclobenzaprine 10 milliGRAM(s) Oral daily PRN  dextrose 5%. 1000 milliLiter(s) IV Continuous <Continuous>  dextrose 5%. 1000 milliLiter(s) IV Continuous <Continuous>  dextrose 50% Injectable 12.5 Gram(s) IV Push once  dextrose 50% Injectable 25 Gram(s) IV Push once  dextrose 50% Injectable 25 Gram(s) IV Push once  dextrose Oral Gel 15 Gram(s) Oral once PRN  diphtheria/tetanus/pertussis (acellular) Vaccine (Adacel) 0.5 milliLiter(s) IntraMuscular once  furosemide    Tablet 20 milliGRAM(s) Oral daily  gabapentin 400 milliGRAM(s) Oral two times a day  glucagon  Injectable 1 milliGRAM(s) IntraMuscular once  glucagon  Injectable 1 milliGRAM(s) IntraMuscular once  insulin glargine Injectable (LANTUS) 15 Unit(s) SubCutaneous every morning  insulin lispro (ADMELOG) corrective regimen sliding scale   SubCutaneous three times a day before meals  insulin lispro (ADMELOG) corrective regimen sliding scale   SubCutaneous at bedtime  insulin lispro Injectable (ADMELOG) 5 Unit(s) SubCutaneous three times a day before meals  loratadine 10 milliGRAM(s) Oral daily  melatonin 3 milliGRAM(s) Oral at bedtime PRN  metoprolol succinate ER 25 milliGRAM(s) Oral daily  montelukast 10 milliGRAM(s) Oral at bedtime  morphine ER Tablet 15 milliGRAM(s) Oral two times a day  OXcarbazepine 600 milliGRAM(s) Oral two times a day  pantoprazole    Tablet 40 milliGRAM(s) Oral two times a day  PARoxetine 40 milliGRAM(s) Oral at bedtime  potassium chloride    Tablet ER 40 milliEquivalent(s) Oral every 4 hours  predniSONE   Tablet 20 milliGRAM(s) Oral at bedtime  SUMAtriptan 100 milliGRAM(s) Oral daily PRN  traZODone 50 milliGRAM(s) Oral at bedtime PRN      Vital Signs Last 24 Hrs  T(C): 37.1 (29 Mar 2024 09:34), Max: 37.3 (29 Mar 2024 01:30)  T(F): 98.8 (29 Mar 2024 09:34), Max: 99.1 (29 Mar 2024 01:30)  HR: 90 (29 Mar 2024 09:34) (79 - 98)  BP: 139/78 (29 Mar 2024 09:34) (112/74 - 139/80)  BP(mean): --  RR: 18 (29 Mar 2024 09:34) (12 - 23)  SpO2: 94% (29 Mar 2024 09:34) (93% - 99%)    Parameters below as of 29 Mar 2024 09:34  Patient On (Oxygen Delivery Method): room air        Physical Exam:  Constitutional: non-toxic, no distress, obese   HEAD/EYES: anicteric, no conjunctival injection  ENT:  supple, no thrush, poor dentition, missing teeth   Cardiovascular:   normal S1, S2, no murmur, no edema  Respiratory:  clear BS bilaterally, no wheezes, no rales  GI:  soft, non-tender, normal bowel sounds  :  no viveros, no CVA tenderness  Musculoskeletal:  right hand is fully dressed post-surgery, in sling   Neurologic: awake and alert, normal strength, no focal findings  Skin:  no rash, no erythema, no phlebitis, tattoos   Heme/Onc: no lymphadenopathy   Psychiatric:  awake, alert, appropriate mood    WBC Count: 7.56 K/uL (03-28 @ 04:28)  WBC Count: 8.51 K/uL (03-27 @ 08:00)  WBC Count: 7.49 K/uL (03-26 @ 16:41)  WBC Count: 8.90 K/uL (03-25 @ 16:03)                            11.8   7.56  )-----------( 228      ( 28 Mar 2024 04:28 )             36.6       03-29    141  |  102  |  11  ----------------------------<  297<H>  2.9<LL>   |  28  |  0.70    Ca    8.6      29 Mar 2024 07:10        Urinalysis Basic - ( 29 Mar 2024 07:10 )    Color: x / Appearance: x / SG: x / pH: x  Gluc: 297 mg/dL / Ketone: x  / Bili: x / Urobili: x   Blood: x / Protein: x / Nitrite: x   Leuk Esterase: x / RBC: x / WBC x   Sq Epi: x / Non Sq Epi: x / Bacteria: x        Creatinine Trend: 0.70<--, 0.70<--, 0.69<--, 0.69<--, 0.76<--      MICROBIOLOGY:  Vancomycin Level, Trough: 16.8 ug/mL (03-28-24 @ 21:19)  v  C-Reactive Protein, Serum: 12 (03-26)  C-Reactive Protein, Serum: 18 (03-25)      RADIOLOGY:    
CHIEF COMPLAINT: Pain right ring finger 10/10.   no chest pain or sob  no fever  no n.v.d    PHYSICAL EXAM:    GENERAL: Morbidly obese, no acute distress   CHEST/LUNG:  No wheezing, no crackles   HEART: Regular rate and rhythm; No murmurs  ABDOMEN: Soft, Nontender, Nondistended; Bowel sounds present  EXTREMITIES:  No clubbing, cyanosis, or edema legs. S/p I and D right 4th finger   Psychiatry: AAO x 3, mood is appropriate       OBJECTIVE DATA:     Vital Signs Last 24 Hrs  T(C): 37.1 (29 Mar 2024 09:34), Max: 37.3 (29 Mar 2024 01:30)  T(F): 98.8 (29 Mar 2024 09:34), Max: 99.1 (29 Mar 2024 01:30)  HR: 90 (29 Mar 2024 09:34) (79 - 98)  BP: 139/78 (29 Mar 2024 09:34) (112/74 - 139/80)  BP(mean): --  RR: 18 (29 Mar 2024 09:34) (12 - 23)  SpO2: 94% (29 Mar 2024 09:34) (93% - 99%)    Parameters below as of 29 Mar 2024 09:34  Patient On (Oxygen Delivery Method): room air               Daily     Daily   LABS:                        11.8   7.56  )-----------( 228      ( 28 Mar 2024 04:28 )             36.6             03-29    141  |  102  |  11  ----------------------------<  297<H>  2.9<LL>   |  28  |  0.70    Ca    8.6      29 Mar 2024 07:10                  Urinalysis Basic - ( 29 Mar 2024 07:10 )    Color: x / Appearance: x / SG: x / pH: x  Gluc: 297 mg/dL / Ketone: x  / Bili: x / Urobili: x   Blood: x / Protein: x / Nitrite: x   Leuk Esterase: x / RBC: x / WBC x   Sq Epi: x / Non Sq Epi: x / Bacteria: x           CAPILLARY BLOOD GLUCOSE      POCT Blood Glucose.: 261 mg/dL (29 Mar 2024 11:20)      MEDICATIONS  (STANDING):  amphetamine/dextroamphetamine 20 milliGRAM(s) Oral two times a day  ARIPiprazole 2 milliGRAM(s) Oral at bedtime  dextrose 5%. 1000 milliLiter(s) (100 mL/Hr) IV Continuous <Continuous>  dextrose 5%. 1000 milliLiter(s) (50 mL/Hr) IV Continuous <Continuous>  dextrose 50% Injectable 12.5 Gram(s) IV Push once  dextrose 50% Injectable 25 Gram(s) IV Push once  dextrose 50% Injectable 25 Gram(s) IV Push once  diphtheria/tetanus/pertussis (acellular) Vaccine (Adacel) 0.5 milliLiter(s) IntraMuscular once  doxycycline monohydrate Capsule 100 milliGRAM(s) Oral two times a day  furosemide    Tablet 20 milliGRAM(s) Oral daily  gabapentin 400 milliGRAM(s) Oral two times a day  glucagon  Injectable 1 milliGRAM(s) IntraMuscular once  glucagon  Injectable 1 milliGRAM(s) IntraMuscular once  insulin glargine Injectable (LANTUS) 15 Unit(s) SubCutaneous every morning  insulin lispro (ADMELOG) corrective regimen sliding scale   SubCutaneous three times a day before meals  insulin lispro (ADMELOG) corrective regimen sliding scale   SubCutaneous at bedtime  insulin lispro Injectable (ADMELOG) 5 Unit(s) SubCutaneous three times a day before meals  loratadine 10 milliGRAM(s) Oral daily  metoprolol succinate ER 25 milliGRAM(s) Oral daily  montelukast 10 milliGRAM(s) Oral at bedtime  morphine ER Tablet 15 milliGRAM(s) Oral two times a day  OXcarbazepine 600 milliGRAM(s) Oral two times a day  pantoprazole    Tablet 40 milliGRAM(s) Oral two times a day  PARoxetine 40 milliGRAM(s) Oral at bedtime  potassium chloride    Tablet ER 40 milliEquivalent(s) Oral every 4 hours  predniSONE   Tablet 20 milliGRAM(s) Oral at bedtime    MEDICATIONS  (PRN):  acetaminophen     Tablet .. 650 milliGRAM(s) Oral every 6 hours PRN Temp greater or equal to 38C (100.4F), Mild Pain (1 - 3)  albuterol    0.083%. 2.5 milliGRAM(s) Nebulizer once PRN Bronchospasm  benzocaine/menthol Lozenge 1 Lozenge Oral two times a day PRN Sore Throat  cyclobenzaprine 10 milliGRAM(s) Oral daily PRN Muscle Spasm  dextrose Oral Gel 15 Gram(s) Oral once PRN Blood Glucose LESS THAN 70 milliGRAM(s)/deciliter  melatonin 3 milliGRAM(s) Oral at bedtime PRN Insomnia  SUMAtriptan 100 milliGRAM(s) Oral daily PRN migraines  traZODone 50 milliGRAM(s) Oral at bedtime PRN for insomnia      
Patient seen and examined  reports right 4th finger pain swelling and lack of mobility  for MRI  Review of Systems:  General:denies fever chills, headache, weakness  HEENT: denies blurry vision,diffculty swallowing, difficulty hearing, tinnitus  Cardiovascular: denies chest pain  ,palpitations  Pulmonary:denies shortness of breath, cough, wheezing, hemoptysis  Gastrointestinal: denies abdominal pain, constipation, diarrhea,nausea , vomiting, hematochezia  : denies hematuria, dysuria, or incontinence  Neurological: denies weakness, numbness , tingling, dizziness, tremors  MSK: denies muscle pain, difficulty ambulating, swelling, back pain  skin: denies skin rash, itching, burning, or  skin lesions  Psychiatrical: denies mood disturbances, anxierty, feeling depressed, depression , or difficulty sleeping    Objective:  Vitals  T(C): 37 (03-26-24 @ 11:02), Max: 37 (03-26-24 @ 11:02)  HR: 81 (03-26-24 @ 11:02) (77 - 84)  BP: 124/79 (03-26-24 @ 11:02) (123/81 - 150/86)  RR: 17 (03-26-24 @ 11:02) (17 - 18)  SpO2: 96% (03-26-24 @ 11:02) (95% - 98%)    Physical Exam:  General: comfortable, no acute distress  HEENT: Atraumatic, no LAD, trachea midline, PERRLA  Cardiovascular: normal s1s2, no murmurs, gallops or fricition rubs  Pulmonary: clear to ausculation Bilaterally, no wheezing , rhonchi  Gastrointestinal: soft non tender non distended, no masses felt, no organomegally  Muscloskeletal: no lower extremity edema, intact bilateral lower extremity pulses  Neurological: CN II-12 intact. No focal weakness  Psychiatrical: normal mood, cooperative  SKIN: no rash, lesions or ulcers    Labs:                          11.5   8.90  )-----------( 232      ( 25 Mar 2024 16:03 )             34.9     03-26    x   |  x   |  x   ----------------------------<  x   3.3<L>   |  x   |  x     Ca    9.4      25 Mar 2024 16:03  Mg     1.5     03-26    TPro  7.2  /  Alb  3.3  /  TBili  0.4  /  DBili  x   /  AST  37  /  ALT  27  /  AlkPhos  88  03-25    LIVER FUNCTIONS - ( 25 Mar 2024 16:03 )  Alb: 3.3 g/dL / Pro: 7.2 gm/dL / ALK PHOS: 88 U/L / ALT: 27 U/L / AST: 37 U/L / GGT: x                 Active Medications  MEDICATIONS  (STANDING):  amphetamine/dextroamphetamine 20 milliGRAM(s) Oral two times a day  ARIPiprazole 2 milliGRAM(s) Oral at bedtime  bacitracin   Ointment 1 Application(s) Topical every 12 hours  dextrose 5%. 1000 milliLiter(s) (50 mL/Hr) IV Continuous <Continuous>  dextrose 5%. 1000 milliLiter(s) (100 mL/Hr) IV Continuous <Continuous>  dextrose 50% Injectable 25 Gram(s) IV Push once  dextrose 50% Injectable 12.5 Gram(s) IV Push once  dextrose 50% Injectable 25 Gram(s) IV Push once  diphtheria/tetanus/pertussis (acellular) Vaccine (Adacel) 0.5 milliLiter(s) IntraMuscular once  furosemide    Tablet 20 milliGRAM(s) Oral daily  gabapentin 400 milliGRAM(s) Oral two times a day  glucagon  Injectable 1 milliGRAM(s) IntraMuscular once  insulin lispro (ADMELOG) corrective regimen sliding scale   SubCutaneous three times a day before meals  insulin lispro (ADMELOG) corrective regimen sliding scale   SubCutaneous at bedtime  loratadine 10 milliGRAM(s) Oral daily  metoprolol succinate ER 25 milliGRAM(s) Oral daily  metroNIDAZOLE  IVPB 500 milliGRAM(s) IV Intermittent every 8 hours  montelukast 10 milliGRAM(s) Oral at bedtime  morphine ER Tablet 15 milliGRAM(s) Oral two times a day  OXcarbazepine 600 milliGRAM(s) Oral two times a day  oxycodone    5 mG/acetaminophen 325 mG 1 Tablet(s) Oral every 12 hours  pantoprazole    Tablet 40 milliGRAM(s) Oral two times a day  PARoxetine 40 milliGRAM(s) Oral at bedtime  predniSONE   Tablet 20 milliGRAM(s) Oral at bedtime  vancomycin  IVPB 1000 milliGRAM(s) IV Intermittent every 12 hours    MEDICATIONS  (PRN):  acetaminophen     Tablet .. 650 milliGRAM(s) Oral every 6 hours PRN Temp greater or equal to 38C (100.4F), Mild Pain (1 - 3)  aluminum hydroxide/magnesium hydroxide/simethicone Suspension 30 milliLiter(s) Oral every 4 hours PRN Dyspepsia  cyclobenzaprine 10 milliGRAM(s) Oral daily PRN Muscle Spasm  dextrose Oral Gel 15 Gram(s) Oral once PRN Blood Glucose LESS THAN 70 milliGRAM(s)/deciliter  melatonin 3 milliGRAM(s) Oral at bedtime PRN Insomnia  ondansetron Injectable 4 milliGRAM(s) IV Push every 8 hours PRN Nausea and/or Vomiting  SUMAtriptan 100 milliGRAM(s) Oral daily PRN migraines  traZODone 50 milliGRAM(s) Oral at bedtime PRN for insomnia      
CHIEF COMPLAINT: Pain right ring finger  improving.    no chest pain or sob  no fever  no n.v.d    PHYSICAL EXAM:    GENERAL: Morbidly obese, no acute distress   CHEST/LUNG:  No wheezing, no crackles   HEART: Regular rate and rhythm; No murmurs  ABDOMEN: Soft, Nontender, Nondistended; Bowel sounds present  EXTREMITIES:  No clubbing, cyanosis, or edema legs. S/p I and D right 4th finger   Psychiatry: AAO x 3, mood is appropriate       OBJECTIVE DATA:       Vital Signs Last 24 Hrs  T(C): 36.7 (30 Mar 2024 06:37), Max: 37.2 (30 Mar 2024 00:13)  T(F): 98.1 (30 Mar 2024 06:37), Max: 99 (30 Mar 2024 00:13)  HR: 62 (30 Mar 2024 06:37) (62 - 86)  BP: 112/70 (30 Mar 2024 06:37) (112/70 - 147/89)  BP(mean): --  RR: 18 (30 Mar 2024 06:37) (18 - 18)  SpO2: 93% (30 Mar 2024 06:37) (93% - 96%)    Parameters below as of 30 Mar 2024 06:37  Patient On (Oxygen Delivery Method): room air               Daily     Daily   LABS:            03-29    141  |  102  |  11  ----------------------------<  297<H>  2.9<LL>   |  28  |  0.70    Ca    8.6      29 Mar 2024 07:10                  Urinalysis Basic - ( 29 Mar 2024 07:10 )    Color: x / Appearance: x / SG: x / pH: x  Gluc: 297 mg/dL / Ketone: x  / Bili: x / Urobili: x   Blood: x / Protein: x / Nitrite: x   Leuk Esterase: x / RBC: x / WBC x   Sq Epi: x / Non Sq Epi: x / Bacteria: x           CAPILLARY BLOOD GLUCOSE      POCT Blood Glucose.: 314 mg/dL (30 Mar 2024 08:20)      MEDICATIONS  (STANDING):  amphetamine/dextroamphetamine 20 milliGRAM(s) Oral two times a day  ARIPiprazole 2 milliGRAM(s) Oral at bedtime  dextrose 5%. 1000 milliLiter(s) (100 mL/Hr) IV Continuous <Continuous>  dextrose 5%. 1000 milliLiter(s) (50 mL/Hr) IV Continuous <Continuous>  dextrose 50% Injectable 12.5 Gram(s) IV Push once  dextrose 50% Injectable 25 Gram(s) IV Push once  dextrose 50% Injectable 25 Gram(s) IV Push once  diphtheria/tetanus/pertussis (acellular) Vaccine (Adacel) 0.5 milliLiter(s) IntraMuscular once  doxycycline monohydrate Capsule 100 milliGRAM(s) Oral two times a day  furosemide    Tablet 20 milliGRAM(s) Oral daily  gabapentin 400 milliGRAM(s) Oral two times a day  glucagon  Injectable 1 milliGRAM(s) IntraMuscular once  glucagon  Injectable 1 milliGRAM(s) IntraMuscular once  insulin glargine Injectable (LANTUS) 15 Unit(s) SubCutaneous every morning  insulin lispro (ADMELOG) corrective regimen sliding scale   SubCutaneous at bedtime  insulin lispro (ADMELOG) corrective regimen sliding scale   SubCutaneous three times a day before meals  insulin lispro Injectable (ADMELOG) 5 Unit(s) SubCutaneous three times a day before meals  loratadine 10 milliGRAM(s) Oral daily  metoprolol succinate ER 25 milliGRAM(s) Oral daily  montelukast 10 milliGRAM(s) Oral at bedtime  morphine ER Tablet 15 milliGRAM(s) Oral two times a day  OXcarbazepine 600 milliGRAM(s) Oral two times a day  pantoprazole    Tablet 40 milliGRAM(s) Oral two times a day  PARoxetine 40 milliGRAM(s) Oral at bedtime  predniSONE   Tablet 20 milliGRAM(s) Oral at bedtime    MEDICATIONS  (PRN):  acetaminophen     Tablet .. 650 milliGRAM(s) Oral every 6 hours PRN Temp greater or equal to 38C (100.4F), Mild Pain (1 - 3)  albuterol    0.083%. 2.5 milliGRAM(s) Nebulizer once PRN Bronchospasm  benzocaine/menthol Lozenge 1 Lozenge Oral two times a day PRN Sore Throat  cyclobenzaprine 10 milliGRAM(s) Oral daily PRN Muscle Spasm  dextrose Oral Gel 15 Gram(s) Oral once PRN Blood Glucose LESS THAN 70 milliGRAM(s)/deciliter  melatonin 3 milliGRAM(s) Oral at bedtime PRN Insomnia  simethicone 80 milliGRAM(s) Chew every 6 hours PRN Gas  SUMAtriptan 100 milliGRAM(s) Oral daily PRN migraines  traZODone 50 milliGRAM(s) Oral at bedtime PRN for insomnia      
CHIEF COMPLAINT: pain ring finger. not able to bend this finger.   no chest pain or sob  no fever  no n.v.d      PHYSICAL EXAM:    GENERAL: Morbidly obese, no acute distress   CHEST/LUNG:  No wheezing, no crackles   HEART: Regular rate and rhythm; No murmurs  ABDOMEN: Soft, Nontender, Nondistended; Bowel sounds present  EXTREMITIES:  No clubbing, cyanosis, or edema legs. Patient has localized tenderness, redness and swelling 4th finger right Hand.   Medically ready for discharge: [ ] Yes   Discharge barriers:.   Psychiatry: AAO x 3, mood is appropriate       OBJECTIVE DATA:   Vital Signs Last 24 Hrs  T(C): 37 (27 Mar 2024 11:03), Max: 37 (27 Mar 2024 11:03)  T(F): 98.6 (27 Mar 2024 11:03), Max: 98.6 (27 Mar 2024 11:03)  HR: 89 (27 Mar 2024 11:03) (82 - 89)  BP: 133/79 (27 Mar 2024 11:03) (126/84 - 133/79)  BP(mean): --  RR: 18 (27 Mar 2024 11:03) (18 - 18)  SpO2: 96% (27 Mar 2024 11:03) (95% - 97%)    Parameters below as of 27 Mar 2024 11:03  Patient On (Oxygen Delivery Method): room air               Daily     Daily   LABS:                        11.7   8.51  )-----------( 239      ( 27 Mar 2024 08:00 )             35.9             03-27    137  |  101  |  13  ----------------------------<  305<H>  3.3<L>   |  29  |  0.69    Ca    9.3      27 Mar 2024 08:00  Mg     1.5     03-26    TPro  7.2  /  Alb  3.3  /  TBili  0.4  /  DBili  x   /  AST  37  /  ALT  27  /  AlkPhos  88  03-25                Urinalysis Basic - ( 27 Mar 2024 08:00 )    Color: x / Appearance: x / SG: x / pH: x  Gluc: 305 mg/dL / Ketone: x  / Bili: x / Urobili: x   Blood: x / Protein: x / Nitrite: x   Leuk Esterase: x / RBC: x / WBC x   Sq Epi: x / Non Sq Epi: x / Bacteria: x            CAPILLARY BLOOD GLUCOSE      POCT Blood Glucose.: 199 mg/dL (27 Mar 2024 11:10)         Interval Radiology studies: reviewed by me  < from: CT Hand No Cont, Right (03.27.24 @ 13:50) >  1.  Soft tissue swelling within the ring finger.  2.  Suspect moderate ring finger flexor tenosynovitis prominentat the   middle and proximal phalanges.  3.  No CT evidence for osteomyelitis.    < end of copied text >      MEDICATIONS  (STANDING):  amphetamine/dextroamphetamine 20 milliGRAM(s) Oral two times a day  ARIPiprazole 2 milliGRAM(s) Oral at bedtime  bacitracin   Ointment 1 Application(s) Topical every 12 hours  dextrose 5%. 1000 milliLiter(s) (50 mL/Hr) IV Continuous <Continuous>  dextrose 5%. 1000 milliLiter(s) (100 mL/Hr) IV Continuous <Continuous>  dextrose 50% Injectable 25 Gram(s) IV Push once  dextrose 50% Injectable 25 Gram(s) IV Push once  dextrose 50% Injectable 12.5 Gram(s) IV Push once  diphtheria/tetanus/pertussis (acellular) Vaccine (Adacel) 0.5 milliLiter(s) IntraMuscular once  furosemide    Tablet 20 milliGRAM(s) Oral daily  gabapentin 400 milliGRAM(s) Oral two times a day  glucagon  Injectable 1 milliGRAM(s) IntraMuscular once  insulin glargine Injectable (LANTUS) 10 Unit(s) SubCutaneous every morning  insulin lispro (ADMELOG) corrective regimen sliding scale   SubCutaneous at bedtime  insulin lispro (ADMELOG) corrective regimen sliding scale   SubCutaneous three times a day before meals  insulin lispro Injectable (ADMELOG) 3 Unit(s) SubCutaneous three times a day before meals  loratadine 10 milliGRAM(s) Oral daily  metoprolol succinate ER 25 milliGRAM(s) Oral daily  metroNIDAZOLE    Tablet 500 milliGRAM(s) Oral every 8 hours  montelukast 10 milliGRAM(s) Oral at bedtime  morphine ER Tablet 15 milliGRAM(s) Oral two times a day  OXcarbazepine 600 milliGRAM(s) Oral two times a day  oxycodone    5 mG/acetaminophen 325 mG 1 Tablet(s) Oral every 12 hours  pantoprazole    Tablet 40 milliGRAM(s) Oral two times a day  PARoxetine 40 milliGRAM(s) Oral at bedtime  predniSONE   Tablet 20 milliGRAM(s) Oral at bedtime  vancomycin  IVPB 1000 milliGRAM(s) IV Intermittent every 8 hours    MEDICATIONS  (PRN):  acetaminophen     Tablet .. 650 milliGRAM(s) Oral every 6 hours PRN Temp greater or equal to 38C (100.4F), Mild Pain (1 - 3)  aluminum hydroxide/magnesium hydroxide/simethicone Suspension 30 milliLiter(s) Oral every 4 hours PRN Dyspepsia  cyclobenzaprine 10 milliGRAM(s) Oral daily PRN Muscle Spasm  dextrose Oral Gel 15 Gram(s) Oral once PRN Blood Glucose LESS THAN 70 milliGRAM(s)/deciliter  melatonin 3 milliGRAM(s) Oral at bedtime PRN Insomnia  ondansetron Injectable 4 milliGRAM(s) IV Push every 8 hours PRN Nausea and/or Vomiting  SUMAtriptan 100 milliGRAM(s) Oral daily PRN migraines  traZODone 50 milliGRAM(s) Oral at bedtime PRN for insomnia      
CHIEF COMPLAINT: pain ring finger. not able to bend this finger.   no chest pain or sob  no fever  no n.v.d  NPO for I and D ring finger cellulitis    PHYSICAL EXAM:    GENERAL: Morbidly obese, no acute distress   CHEST/LUNG:  No wheezing, no crackles   HEART: Regular rate and rhythm; No murmurs  ABDOMEN: Soft, Nontender, Nondistended; Bowel sounds present  EXTREMITIES:  No clubbing, cyanosis, or edema legs. Patient has localized tenderness, redness and swelling 4th finger right Hand.   Psychiatry: AAO x 3, mood is appropriate       OBJECTIVE DATA:     Vital Signs Last 24 Hrs  T(C): 36.9 (28 Mar 2024 10:53), Max: 36.9 (28 Mar 2024 05:07)  T(F): 98.5 (28 Mar 2024 10:53), Max: 98.5 (28 Mar 2024 10:53)  HR: 78 (28 Mar 2024 10:53) (76 - 95)  BP: 122/63 (28 Mar 2024 10:53) (104/66 - 129/82)  BP(mean): --  RR: 18 (28 Mar 2024 10:53) (16 - 18)  SpO2: 96% (28 Mar 2024 10:53) (93% - 97%)    Parameters below as of 28 Mar 2024 10:53  Patient On (Oxygen Delivery Method): room air               Daily     Daily Weight in k.5 (28 Mar 2024 05:07)  LABS:                        11.8   7.56  )-----------( 228      ( 28 Mar 2024 04:28 )             36.6             -    138  |  101  |  11  ----------------------------<  306<H>  3.2<L>   |  32<H>  |  0.70    Ca    9.1      28 Mar 2024 04:28                  Urinalysis Basic - ( 28 Mar 2024 04:28 )    Color: x / Appearance: x / SG: x / pH: x  Gluc: 306 mg/dL / Ketone: x  / Bili: x / Urobili: x   Blood: x / Protein: x / Nitrite: x   Leuk Esterase: x / RBC: x / WBC x   Sq Epi: x / Non Sq Epi: x / Bacteria: x           CAPILLARY BLOOD GLUCOSE      POCT Blood Glucose.: 153 mg/dL (28 Mar 2024 15:48)       < from: CT Hand No Cont, Right (24 @ 13:50) >  1.  Soft tissue swelling within the ring finger.  2.  Suspect moderate ring finger flexor tenosynovitis prominentat the   middle and proximal phalanges.  3.  No CT evidence for osteomyelitis.    < end of copied text >    MEDICATIONS  (STANDING):    MEDICATIONS  (PRN):  albuterol    0.083%. 2.5 milliGRAM(s) Nebulizer once PRN Bronchospasm  HYDROmorphone  Injectable 0.5 milliGRAM(s) IV Push every 10 minutes PRN Moderate Pain (4 - 6)  ondansetron Injectable 4 milliGRAM(s) IV Push once PRN Nausea and/or Vomiting

## 2024-03-30 NOTE — PROGRESS NOTE ADULT - REASON FOR ADMISSION
R. 4th finger cellulitis and open wounds secondary to cat bite, need to r/o tendon injury

## 2024-03-30 NOTE — PROGRESS NOTE ADULT - ASSESSMENT
POD 2 i and d abscess ulnar side right ring finger prox phal. and exp flexor tendon sheath, culture abscess and tendon sheath no growth to date.  -local wound care qod. see order.  pt may need visiting nurse service  -cont elevation and gently self ROM  -abx per ID  -follow up Dr. Fernandez 7-10 days post d/c, call [phone] for appt.

## 2024-03-31 VITALS
HEART RATE: 73 BPM | SYSTOLIC BLOOD PRESSURE: 132 MMHG | DIASTOLIC BLOOD PRESSURE: 93 MMHG | TEMPERATURE: 98 F | OXYGEN SATURATION: 93 % | RESPIRATION RATE: 18 BRPM

## 2024-03-31 LAB — GLUCOSE BLDC GLUCOMTR-MCNC: 268 MG/DL — HIGH (ref 70–99)

## 2024-03-31 PROCEDURE — 99239 HOSP IP/OBS DSCHRG MGMT >30: CPT

## 2024-03-31 RX ADMIN — Medication 3: at 08:13

## 2024-03-31 RX ADMIN — INSULIN GLARGINE 15 UNIT(S): 100 INJECTION, SOLUTION SUBCUTANEOUS at 09:26

## 2024-03-31 RX ADMIN — Medication 25 MILLIGRAM(S): at 05:56

## 2024-03-31 RX ADMIN — MORPHINE SULFATE 15 MILLIGRAM(S): 50 CAPSULE, EXTENDED RELEASE ORAL at 05:55

## 2024-03-31 RX ADMIN — Medication 100 MILLIGRAM(S): at 05:56

## 2024-03-31 RX ADMIN — DEXTROAMPHETAMINE SACCHARATE, AMPHETAMINE ASPARTATE, DEXTROAMPHETAMINE SULFATE AND AMPHETAMINE SULFATE 20 MILLIGRAM(S): 1.875; 1.875; 1.875; 1.875 TABLET ORAL at 05:55

## 2024-03-31 RX ADMIN — Medication 20 MILLIGRAM(S): at 05:56

## 2024-03-31 RX ADMIN — PANTOPRAZOLE SODIUM 40 MILLIGRAM(S): 20 TABLET, DELAYED RELEASE ORAL at 05:56

## 2024-03-31 RX ADMIN — OXCARBAZEPINE 600 MILLIGRAM(S): 300 TABLET, FILM COATED ORAL at 05:56

## 2024-03-31 RX ADMIN — Medication 5 UNIT(S): at 08:14

## 2024-04-01 LAB — SURGICAL PATHOLOGY STUDY: SIGNIFICANT CHANGE UP

## 2024-04-05 DIAGNOSIS — L03.011 CELLULITIS OF RIGHT FINGER: ICD-10-CM

## 2024-04-05 DIAGNOSIS — F31.9 BIPOLAR DISORDER, UNSPECIFIED: ICD-10-CM

## 2024-04-05 DIAGNOSIS — Z88.8 ALLERGY STATUS TO OTHER DRUGS, MEDICAMENTS AND BIOLOGICAL SUBSTANCES: ICD-10-CM

## 2024-04-05 DIAGNOSIS — F43.21 ADJUSTMENT DISORDER WITH DEPRESSED MOOD: ICD-10-CM

## 2024-04-05 DIAGNOSIS — M32.9 SYSTEMIC LUPUS ERYTHEMATOSUS, UNSPECIFIED: ICD-10-CM

## 2024-04-05 DIAGNOSIS — Z88.0 ALLERGY STATUS TO PENICILLIN: ICD-10-CM

## 2024-04-05 DIAGNOSIS — F90.9 ATTENTION-DEFICIT HYPERACTIVITY DISORDER, UNSPECIFIED TYPE: ICD-10-CM

## 2024-04-05 DIAGNOSIS — Z91.041 RADIOGRAPHIC DYE ALLERGY STATUS: ICD-10-CM

## 2024-04-05 DIAGNOSIS — G35 MULTIPLE SCLEROSIS: ICD-10-CM

## 2024-04-05 DIAGNOSIS — Z88.6 ALLERGY STATUS TO ANALGESIC AGENT: ICD-10-CM

## 2024-04-05 DIAGNOSIS — M79.7 FIBROMYALGIA: ICD-10-CM

## 2024-04-05 DIAGNOSIS — E87.6 HYPOKALEMIA: ICD-10-CM

## 2024-04-05 DIAGNOSIS — E66.01 MORBID (SEVERE) OBESITY DUE TO EXCESS CALORIES: ICD-10-CM

## 2024-04-05 DIAGNOSIS — E11.65 TYPE 2 DIABETES MELLITUS WITH HYPERGLYCEMIA: ICD-10-CM

## 2024-04-05 DIAGNOSIS — Z88.1 ALLERGY STATUS TO OTHER ANTIBIOTIC AGENTS STATUS: ICD-10-CM

## 2024-04-05 DIAGNOSIS — Z63.4 DISAPPEARANCE AND DEATH OF FAMILY MEMBER: ICD-10-CM

## 2024-04-05 SDOH — SOCIAL STABILITY - SOCIAL INSECURITY: DISSAPEARANCE AND DEATH OF FAMILY MEMBER: Z63.4

## 2024-04-10 RX ORDER — LEVOCETIRIZINE DIHYDROCHLORIDE 0.5 MG/ML
1 SOLUTION ORAL
Refills: 0 | DISCHARGE

## 2024-04-10 RX ORDER — MORPHINE SULFATE 50 MG/1
1 CAPSULE, EXTENDED RELEASE ORAL
Refills: 0 | DISCHARGE

## 2024-04-10 RX ORDER — FUROSEMIDE 40 MG
1 TABLET ORAL
Refills: 0 | DISCHARGE

## 2024-04-10 RX ORDER — GABAPENTIN 400 MG/1
1 CAPSULE ORAL
Refills: 0 | DISCHARGE

## 2024-04-10 RX ORDER — HYDROCODONE BITARTRATE AND ACETAMINOPHEN 7.5; 325 MG/15ML; MG/15ML
1 SOLUTION ORAL
Refills: 0 | DISCHARGE

## 2024-04-10 RX ORDER — TRAZODONE HCL 50 MG
1 TABLET ORAL
Refills: 0 | DISCHARGE

## 2024-04-10 RX ORDER — DEXTROAMPHETAMINE SACCHARATE, AMPHETAMINE ASPARTATE, DEXTROAMPHETAMINE SULFATE AND AMPHETAMINE SULFATE 1.875; 1.875; 1.875; 1.875 MG/1; MG/1; MG/1; MG/1
1 TABLET ORAL
Refills: 0 | DISCHARGE

## 2024-04-10 RX ORDER — OXCARBAZEPINE 300 MG/1
1 TABLET, FILM COATED ORAL
Refills: 0 | DISCHARGE

## 2024-04-10 RX ORDER — ONDANSETRON 8 MG/1
1 TABLET, FILM COATED ORAL
Refills: 0 | DISCHARGE

## 2024-04-10 RX ORDER — OMEPRAZOLE 10 MG/1
1 CAPSULE, DELAYED RELEASE ORAL
Qty: 0 | Refills: 0 | DISCHARGE

## 2024-04-10 RX ORDER — CYCLOBENZAPRINE HYDROCHLORIDE 10 MG/1
1 TABLET, FILM COATED ORAL
Refills: 0 | DISCHARGE

## 2024-04-10 RX ORDER — METOPROLOL TARTRATE 50 MG
1 TABLET ORAL
Refills: 0 | DISCHARGE

## 2024-04-10 RX ORDER — INSULIN GLARGINE AND LIXISENATIDE 100; 33 U/ML; UG/ML
0 INJECTION, SOLUTION SUBCUTANEOUS
Refills: 0 | DISCHARGE

## 2024-04-10 RX ORDER — MONTELUKAST 4 MG/1
1 TABLET, CHEWABLE ORAL
Refills: 0 | DISCHARGE

## 2024-04-10 RX ORDER — SUMATRIPTAN SUCCINATE 4 MG/.5ML
1 INJECTION, SOLUTION SUBCUTANEOUS
Refills: 0 | DISCHARGE

## 2024-04-10 RX ORDER — ERENUMAB-AOOE 70 MG/ML
70 INJECTION SUBCUTANEOUS
Refills: 0 | DISCHARGE

## 2024-04-10 RX ORDER — ARIPIPRAZOLE 15 MG/1
1 TABLET ORAL
Refills: 0 | DISCHARGE

## 2024-04-30 ENCOUNTER — TRANSCRIPTION ENCOUNTER (OUTPATIENT)
Age: 49
End: 2024-04-30

## 2024-05-09 ENCOUNTER — APPOINTMENT (OUTPATIENT)
Dept: INFECTIOUS DISEASE | Facility: CLINIC | Age: 49
End: 2024-05-09

## 2024-07-26 ENCOUNTER — APPOINTMENT (OUTPATIENT)
Dept: ORTHOPEDIC SURGERY | Facility: CLINIC | Age: 49
End: 2024-07-26

## 2024-09-10 ENCOUNTER — APPOINTMENT (OUTPATIENT)
Dept: ORTHOPEDIC SURGERY | Facility: CLINIC | Age: 49
End: 2024-09-10
Payer: MEDICARE

## 2024-09-10 DIAGNOSIS — M43.17 SPONDYLOLISTHESIS, LUMBOSACRAL REGION: ICD-10-CM

## 2024-09-10 DIAGNOSIS — M54.2 CERVICALGIA: ICD-10-CM

## 2024-09-10 DIAGNOSIS — M51.36 OTHER INTERVERTEBRAL DISC DEGENERATION, LUMBAR REGION: ICD-10-CM

## 2024-09-10 DIAGNOSIS — M32.9 SYSTEMIC LUPUS ERYTHEMATOSUS, UNSPECIFIED: ICD-10-CM

## 2024-09-10 PROCEDURE — 72070 X-RAY EXAM THORAC SPINE 2VWS: CPT

## 2024-09-10 PROCEDURE — 72110 X-RAY EXAM L-2 SPINE 4/>VWS: CPT

## 2024-09-10 PROCEDURE — 99213 OFFICE O/P EST LOW 20 MIN: CPT

## 2024-09-10 PROCEDURE — 72050 X-RAY EXAM NECK SPINE 4/5VWS: CPT

## 2024-09-10 NOTE — HISTORY OF PRESENT ILLNESS
[] : yes [de-identified] : 9/10/2024- AIME ERWIN is a 49 year old female presenting with back pain. Patient states pain started about a month ago without inciting injury, states she has a hot burning pain throughout the spine. Patient has history of Lupus, MS, and Fibromyalgia. N/T throughout the left side of the body. Patient tried ice, heat, and pain medications, no relief. She uses a rolling walker to assist with ambulation. Patient states that she has had spine issues since she was a child. Years of pain down the BLE. Takes a 'water pill' and reports years of L sided body N/T including her saddle region. Reports sensation is present in the R saddle region. Does have h/o urinary incontinence.

## 2024-09-10 NOTE — IMAGING
[de-identified] : CSPINE Inspection: No rash or ecchymosis ROM: Limited all planes Strength: 5/5 bilateral deltoid, biceps, triceps, wrist flexors, wrist extensors, , abductors Sensation: Sensation present to light touch bilateral C5-T1 distributions Reflexes: Negative Lamar's bilaterally.cervspasm  LSPINE ROM: limited all planes Strength: 5/5 bilateral hip flexors, knee extensors, ankle dorsiflexors, EHL, ankle plantarflexors Sensation: Sensation present to light touch bilateral L2-S1 distributions Provocative maneuvers: + bilateral straight leg raise [Straightening consistent with spasm] : Straightening consistent with spasm [Facet arthropathy] : Facet arthropathy [Spondylolithesis] : Spondylolithesis [Disc space narrowing] : Disc space narrowing

## 2024-09-10 NOTE — ASSESSMENT
[FreeTextEntry1] : Complicated history of MS and h/o urinary incontinence, does not seem to be PEDRO Will check lumbar MRI to eval degree of stenosis, discussed PEDRO

## 2024-09-16 ENCOUNTER — APPOINTMENT (OUTPATIENT)
Dept: MRI IMAGING | Facility: CLINIC | Age: 49
End: 2024-09-16

## 2024-10-02 ENCOUNTER — APPOINTMENT (OUTPATIENT)
Dept: ORTHOPEDIC SURGERY | Facility: CLINIC | Age: 49
End: 2024-10-02

## 2024-12-16 ENCOUNTER — APPOINTMENT (OUTPATIENT)
Dept: MRI IMAGING | Facility: CLINIC | Age: 49
End: 2024-12-16
Payer: MEDICARE

## 2024-12-16 PROCEDURE — 72148 MRI LUMBAR SPINE W/O DYE: CPT

## 2024-12-31 ENCOUNTER — APPOINTMENT (OUTPATIENT)
Dept: ORTHOPEDIC SURGERY | Facility: CLINIC | Age: 49
End: 2024-12-31

## 2025-01-21 ENCOUNTER — APPOINTMENT (OUTPATIENT)
Dept: ORTHOPEDIC SURGERY | Facility: CLINIC | Age: 50
End: 2025-01-21
Payer: MEDICARE

## 2025-01-21 DIAGNOSIS — M43.17 SPONDYLOLISTHESIS, LUMBOSACRAL REGION: ICD-10-CM

## 2025-01-21 PROCEDURE — 99215 OFFICE O/P EST HI 40 MIN: CPT

## 2025-01-23 ENCOUNTER — APPOINTMENT (OUTPATIENT)
Dept: ORTHOPEDIC SURGERY | Facility: CLINIC | Age: 50
End: 2025-01-23
Payer: MEDICARE

## 2025-01-23 VITALS — HEIGHT: 65 IN | BODY MASS INDEX: 39.99 KG/M2 | WEIGHT: 240 LBS

## 2025-01-23 DIAGNOSIS — S80.02XA CONTUSION OF LEFT KNEE, INITIAL ENCOUNTER: ICD-10-CM

## 2025-01-23 DIAGNOSIS — S80.01XA CONTUSION OF RIGHT KNEE, INITIAL ENCOUNTER: ICD-10-CM

## 2025-01-23 DIAGNOSIS — E11.9 TYPE 2 DIABETES MELLITUS W/OUT COMPLICATIONS: ICD-10-CM

## 2025-01-23 PROCEDURE — 73564 X-RAY EXAM KNEE 4 OR MORE: CPT | Mod: 50

## 2025-01-23 PROCEDURE — 99214 OFFICE O/P EST MOD 30 MIN: CPT

## (undated) DEVICE — DRAPE TOWEL BLUE 17" X 24"

## (undated) DEVICE — ELCTR GROUNDING PAD ADULT COVIDIEN

## (undated) DEVICE — BLADE SURGICAL #15 CARBON

## (undated) DEVICE — POSITIONER PATIENT SAFETY STRAP 3X60"

## (undated) DEVICE — DRAPE EXTREMITY 87" X 106" X 128"

## (undated) DEVICE — SUT MONOCRYL 4-0 27" PS-2 UNDYED

## (undated) DEVICE — Device

## (undated) DEVICE — PACK ORTHO

## (undated) DEVICE — FRA-TOURNIQUET 402010120012: Type: DURABLE MEDICAL EQUIPMENT

## (undated) DEVICE — FRA-ESU BOVIE FORCE TRIAD T7J19731DX: Type: DURABLE MEDICAL EQUIPMENT

## (undated) DEVICE — ELCTR STRYKER NEPTUNE SMOKE EVACUATION PENCIL (GREEN)

## (undated) DEVICE — GLV 8 PROTEXIS (WHITE)

## (undated) DEVICE — DRSG WEBRIL 6"

## (undated) DEVICE — GOWN IMPERV BREATHABLE XL

## (undated) DEVICE — SUT ETHILON 4-0 18" PS-2

## (undated) DEVICE — DRAPE 3/4 SHEET W REINFORCEMENT 56X77"

## (undated) DEVICE — SUT VICRYL 3-0 27" SH UNDYED

## (undated) DEVICE — SUT ETHILON 5-0 18" PS-2

## (undated) DEVICE — SOL BETADINE POUCH 0.75OZ STERILE